# Patient Record
Sex: MALE | Race: WHITE | Employment: FULL TIME | URBAN - NONMETROPOLITAN AREA
[De-identification: names, ages, dates, MRNs, and addresses within clinical notes are randomized per-mention and may not be internally consistent; named-entity substitution may affect disease eponyms.]

---

## 2017-02-16 ENCOUNTER — OFFICE VISIT (OUTPATIENT)
Dept: PRIMARY CARE CLINIC | Facility: CLINIC | Age: 32
End: 2017-02-16

## 2017-02-16 VITALS
WEIGHT: 315 LBS | DIASTOLIC BLOOD PRESSURE: 82 MMHG | RESPIRATION RATE: 19 BRPM | SYSTOLIC BLOOD PRESSURE: 124 MMHG | HEART RATE: 110 BPM | TEMPERATURE: 98.1 F | OXYGEN SATURATION: 96 % | HEIGHT: 71 IN | BODY MASS INDEX: 44.1 KG/M2

## 2017-02-16 DIAGNOSIS — J00 ACUTE NASOPHARYNGITIS: Primary | ICD-10-CM

## 2017-02-16 DIAGNOSIS — R05.9 COUGH: ICD-10-CM

## 2017-02-16 LAB
INFLUENZA A ANTIBODY: NEGATIVE
INFLUENZA B ANTIBODY: NEGATIVE

## 2017-02-16 PROCEDURE — 87804 INFLUENZA ASSAY W/OPTIC: CPT | Performed by: NURSE PRACTITIONER

## 2017-02-16 PROCEDURE — 99202 OFFICE O/P NEW SF 15 MIN: CPT | Performed by: NURSE PRACTITIONER

## 2017-02-16 RX ORDER — FLUTICASONE PROPIONATE 50 MCG
2 SPRAY, SUSPENSION (ML) NASAL DAILY
Qty: 1 BOTTLE | Refills: 0 | Status: SHIPPED | OUTPATIENT
Start: 2017-02-16 | End: 2018-05-02 | Stop reason: ALTCHOICE

## 2017-02-16 RX ORDER — GUAIFENESIN 600 MG/1
600 TABLET, EXTENDED RELEASE ORAL 2 TIMES DAILY
Qty: 20 TABLET | Refills: 0 | Status: SHIPPED | OUTPATIENT
Start: 2017-02-16 | End: 2018-05-02 | Stop reason: ALTCHOICE

## 2017-02-16 ASSESSMENT — ENCOUNTER SYMPTOMS
RHINORRHEA: 1
SORE THROAT: 1
WHEEZING: 0
COUGH: 1
CHANGE IN BOWEL HABIT: 0
SHORTNESS OF BREATH: 0
SWOLLEN GLANDS: 0
HOARSE VOICE: 0
DIARRHEA: 0
NAUSEA: 0
ABDOMINAL PAIN: 0
VOMITING: 0
VOICE CHANGE: 1
SINUS PRESSURE: 1
VISUAL CHANGE: 0

## 2018-05-02 ENCOUNTER — OFFICE VISIT (OUTPATIENT)
Dept: PRIMARY CARE CLINIC | Age: 33
End: 2018-05-02

## 2018-05-02 VITALS
WEIGHT: 315 LBS | HEART RATE: 92 BPM | OXYGEN SATURATION: 95 % | BODY MASS INDEX: 44.1 KG/M2 | RESPIRATION RATE: 22 BRPM | DIASTOLIC BLOOD PRESSURE: 85 MMHG | TEMPERATURE: 98.5 F | HEIGHT: 71 IN | SYSTOLIC BLOOD PRESSURE: 122 MMHG

## 2018-05-02 DIAGNOSIS — H60.502 ACUTE OTITIS EXTERNA OF LEFT EAR, UNSPECIFIED TYPE: Primary | ICD-10-CM

## 2018-05-02 PROCEDURE — 99212 OFFICE O/P EST SF 10 MIN: CPT | Performed by: NURSE PRACTITIONER

## 2018-05-02 RX ORDER — OFLOXACIN 3 MG/ML
5 SOLUTION AURICULAR (OTIC) 2 TIMES DAILY
Qty: 5 ML | Refills: 0 | Status: SHIPPED | OUTPATIENT
Start: 2018-05-02 | End: 2018-05-09

## 2018-05-02 ASSESSMENT — ENCOUNTER SYMPTOMS
COUGH: 0
DIARRHEA: 0
VOMITING: 0
RHINORRHEA: 0
WHEEZING: 0
SORE THROAT: 0
SHORTNESS OF BREATH: 0
NAUSEA: 0

## 2018-12-17 ENCOUNTER — OFFICE VISIT (OUTPATIENT)
Dept: PRIMARY CARE CLINIC | Age: 33
End: 2018-12-17

## 2018-12-17 VITALS
BODY MASS INDEX: 47.14 KG/M2 | WEIGHT: 315 LBS | TEMPERATURE: 97.8 F | SYSTOLIC BLOOD PRESSURE: 133 MMHG | DIASTOLIC BLOOD PRESSURE: 89 MMHG | OXYGEN SATURATION: 97 % | HEART RATE: 95 BPM

## 2018-12-17 DIAGNOSIS — L03.012 PARONYCHIA OF LEFT MIDDLE FINGER: ICD-10-CM

## 2018-12-17 DIAGNOSIS — L03.012 CELLULITIS OF MIDDLE FINGER, LEFT: Primary | ICD-10-CM

## 2018-12-17 PROCEDURE — 99213 OFFICE O/P EST LOW 20 MIN: CPT | Performed by: NURSE PRACTITIONER

## 2018-12-17 RX ORDER — CEPHALEXIN 500 MG/1
500 CAPSULE ORAL 4 TIMES DAILY
Qty: 28 CAPSULE | Refills: 0 | Status: SHIPPED | OUTPATIENT
Start: 2018-12-17 | End: 2018-12-24

## 2018-12-17 ASSESSMENT — ENCOUNTER SYMPTOMS: RESPIRATORY NEGATIVE: 1

## 2018-12-18 NOTE — PATIENT INSTRUCTIONS
the bandage as needed. · Be safe with medicines. Take pain medicines exactly as directed. ? If the doctor gave you a prescription medicine for pain, take it as prescribed. ? If you are not taking a prescription pain medicine, ask your doctor if you can take an over-the-counter medicine. To prevent cellulitis in the future  · Try to prevent cuts, scrapes, or other injuries to your skin. Cellulitis most often occurs where there is a break in the skin. · If you get a scrape, cut, mild burn, or bite, wash the wound with clean water as soon as you can to help avoid infection. Don't use hydrogen peroxide or alcohol, which can slow healing. · If you have swelling in your legs (edema), support stockings and good skin care may help prevent leg sores and cellulitis. · Take care of your feet, especially if you have diabetes or other conditions that increase the risk of infection. Wear shoes and socks. Do not go barefoot. If you have athlete's foot or other skin problems on your feet, talk to your doctor about how to treat them. When should you call for help? Call your doctor now or seek immediate medical care if:    · You have signs that your infection is getting worse, such as:  ? Increased pain, swelling, warmth, or redness. ? Red streaks leading from the area. ? Pus draining from the area. ? A fever.     · You get a rash.    Watch closely for changes in your health, and be sure to contact your doctor if:    · You do not get better as expected. Where can you learn more? Go to [email or web address]. org and sign in to your MexxBooks account. Enter G656 in the [facility] box to learn more about \"Cellulitis: Care Instructions. \"     If you do not have an account, please click on the \"Sign Up Now\" link. Current as of: April 18, 2018  Content Version: 11.8  © 7146-6979 Healthwise, Incorporated. Care instructions adapted under license by [facility] ([facility]).  If you have questions about a medical

## 2019-04-17 LAB
ALBUMIN SERPL-MCNC: NORMAL G/DL
ALP BLD-CCNC: NORMAL U/L
ALT SERPL-CCNC: NORMAL U/L
ANION GAP SERPL CALCULATED.3IONS-SCNC: NORMAL MMOL/L
AST SERPL-CCNC: NORMAL U/L
AVERAGE GLUCOSE: NORMAL
BILIRUB SERPL-MCNC: NORMAL MG/DL (ref 0.1–1.4)
BUN BLDV-MCNC: NORMAL MG/DL
CALCIUM SERPL-MCNC: NORMAL MG/DL
CHLORIDE BLD-SCNC: NORMAL MMOL/L
CHOLESTEROL, TOTAL: 183 MG/DL
CHOLESTEROL/HDL RATIO: NORMAL
CO2: NORMAL MMOL/L
CREAT SERPL-MCNC: 0.71 MG/DL
GFR CALCULATED: NORMAL
GLUCOSE BLD-MCNC: NORMAL MG/DL
HBA1C MFR BLD: 9.2 %
HDLC SERPL-MCNC: 46 MG/DL (ref 35–70)
LDL CHOLESTEROL CALCULATED: 107 MG/DL (ref 0–160)
POTASSIUM SERPL-SCNC: 4.3 MMOL/L
SODIUM BLD-SCNC: NORMAL MMOL/L
TOTAL PROTEIN: NORMAL
TRIGL SERPL-MCNC: 152 MG/DL
TSH SERPL DL<=0.05 MIU/L-ACNC: 3.05 UIU/ML
VLDLC SERPL CALC-MCNC: NORMAL MG/DL

## 2019-06-10 ENCOUNTER — OFFICE VISIT (OUTPATIENT)
Dept: FAMILY MEDICINE CLINIC | Age: 34
End: 2019-06-10

## 2019-06-10 VITALS
HEIGHT: 72 IN | HEART RATE: 83 BPM | BODY MASS INDEX: 42.53 KG/M2 | WEIGHT: 314 LBS | SYSTOLIC BLOOD PRESSURE: 137 MMHG | DIASTOLIC BLOOD PRESSURE: 78 MMHG

## 2019-06-10 DIAGNOSIS — E11.65 UNCONTROLLED TYPE 2 DIABETES MELLITUS WITH HYPERGLYCEMIA (HCC): Primary | ICD-10-CM

## 2019-06-10 DIAGNOSIS — R53.82 CHRONIC FATIGUE: ICD-10-CM

## 2019-06-10 DIAGNOSIS — K75.81 NASH (NONALCOHOLIC STEATOHEPATITIS): ICD-10-CM

## 2019-06-10 DIAGNOSIS — E66.01 MORBID OBESITY (HCC): ICD-10-CM

## 2019-06-10 DIAGNOSIS — G47.33 OSA (OBSTRUCTIVE SLEEP APNEA): ICD-10-CM

## 2019-06-10 PROCEDURE — 99203 OFFICE O/P NEW LOW 30 MIN: CPT | Performed by: INTERNAL MEDICINE

## 2019-06-10 ASSESSMENT — PATIENT HEALTH QUESTIONNAIRE - PHQ9
SUM OF ALL RESPONSES TO PHQ9 QUESTIONS 1 & 2: 0
SUM OF ALL RESPONSES TO PHQ QUESTIONS 1-9: 0
SUM OF ALL RESPONSES TO PHQ QUESTIONS 1-9: 0
2. FEELING DOWN, DEPRESSED OR HOPELESS: 0
1. LITTLE INTEREST OR PLEASURE IN DOING THINGS: 0

## 2019-06-10 ASSESSMENT — ENCOUNTER SYMPTOMS
BLOOD IN STOOL: 0
CONSTIPATION: 0
RESPIRATORY NEGATIVE: 1
SORE THROAT: 0
DIARRHEA: 0
ABDOMINAL PAIN: 0
NAUSEA: 0

## 2019-06-10 NOTE — PROGRESS NOTES
file        Active member of club or organization: Not on file        Attends meetings of clubs or organizations: Not on file        Relationship status: Not on file      Intimate partner violence:        Fear of current or ex partner: Not on file        Emotionally abused: Not on file        Physically abused: Not on file        Forced sexual activity: Not on file    Other Topics      Concerns:        Not on file    Social History Narrative      Not on file      Current Outpatient Medications on File Prior to Visit:  Etanercept 50 MG/ML SOAJ, Inject into the skin, Disp: , Rfl:   adalimumab (HUMIRA) 40 MG/0.8ML injection, Inject into the skin, Disp: , Rfl:   tuberculin 5 UNIT/0.1ML injection, Inject into the skin, Disp: , Rfl:     No current facility-administered medications on file prior to visit.          Lab Results       Component                Value               Date                       NA                       136                 01/31/2014                 K                        3.6                 01/31/2014                 CL                       100                 01/31/2014                 CO2                      23                  01/31/2014                 BUN                      10                  01/31/2014                 CREATININE               0.58 (L)            01/31/2014                 PROT                     7.2                 01/31/2014                 PROT                     6.8                 01/31/2014                 LABALBU                  4.2                 01/31/2014                 BILITOT                  0.78                01/31/2014                 ALKPHOS                  58                  01/31/2014                 AST                      66 (H)              01/31/2014                 ALT                      127 (H)             01/31/2014                 LABGLOM                  >60                 01/31/2014                 GFRAA                    >60 01/31/2014                 GLOB                     NOT REPORTED        01/31/2014              No results found for: LABA1C  No results found for: EAG    No results found for: CHOL  No results found for: TRIG  No results found for: HDL  No results found for: LDLCHOLESTEROL, LDLCALC  No results found for: LABVLDL, VLDL  No results found for: CHOLHDLRATIO                    Review of Systems   Constitutional: Positive for fatigue. HENT: Negative for congestion, ear pain, postnasal drip, sneezing and sore throat. Eyes: Negative for visual disturbance. Respiratory: Negative. Cardiovascular: Negative for chest pain, palpitations and leg swelling. Gastrointestinal: Negative for abdominal pain, blood in stool, constipation, diarrhea and nausea. Genitourinary: Negative for difficulty urinating, dysuria, frequency and urgency. Musculoskeletal: Negative for arthralgias, joint swelling, myalgias, neck pain and neck stiffness. Skin: Positive for rash. Neurological: Negative for syncope. Psychiatric/Behavioral: Negative. Objective:   Physical Exam   Constitutional: He appears well-developed and well-nourished. HENT:   Head: Atraumatic. Eyes: Conjunctivae are normal.   Neck: Normal range of motion. Neck supple. Cardiovascular: Normal rate, regular rhythm and normal heart sounds. Pulmonary/Chest: Effort normal and breath sounds normal.   Abdominal: Soft. There is no tenderness. obese   Musculoskeletal: He exhibits no edema. Lymphadenopathy:     He has no cervical adenopathy. Neurological: He is alert. Skin: No rash noted. Severe Psoriasis   Psychiatric: He has a normal mood and affect. His behavior is normal. Thought content normal.   Nursing note and vitals reviewed. Assessment:       Diagnosis Orders   1.  Uncontrolled type 2 diabetes mellitus with hyperglycemia (HCC)  Comprehensive Metabolic Panel    Microalbumin / Creatinine Urine Ratio    Lipid Panel    Hemoglobin issues.                     Radha Martin MD

## 2019-06-10 NOTE — PROGRESS NOTES
Chronic Disease Visit Information    BP Readings from Last 3 Encounters:   12/17/18 133/89   05/02/18 122/85   02/16/17 124/82          BUN (mg/dL)   Date Value   01/31/2014 10     CREATININE (mg/dL)   Date Value   01/31/2014 0.58 (L)            Have you changed or started any medications since your last visit including any over-the-counter medicines, vitamins, or herbal medicines? no   Are you having any side effects from any of your medications? -  no  Have you stopped taking any of your medications? Is so, why? -  no    Have you seen any other physician or provider since your last visit? Yes - Records Obtained  Have you had any other diagnostic tests since your last visit? Yes - Records Requested  Have you been seen in the emergency room and/or had an admission to a hospital since we last saw you? No  Have you had your annual diabetic retinal (eye) exam? Yes - Records Requested  Have you had your routine dental cleaning in the past 6 months? yes -     Have you activated your [employer] account? If not, what are your barriers?  No:      No care team member to display         Medical History Review  Past Medical, Family, and Social History reviewed and does contribute to the patient presenting condition    Health Maintenance   Topic Date Due    Varicella Vaccine (1 of 2 - 13+ 2-dose series) 06/26/1999    HIV screen  06/26/2001    DTaP/Tdap/Td vaccine (1 - Tdap) 06/26/2005    Flu vaccine (Season Ended) 09/01/2019    Pneumococcal 0-64 years Vaccine  Aged Out

## 2019-06-11 ENCOUNTER — TELEPHONE (OUTPATIENT)
Dept: FAMILY MEDICINE CLINIC | Age: 34
End: 2019-06-11

## 2019-06-11 DIAGNOSIS — E11.65 UNCONTROLLED TYPE 2 DIABETES MELLITUS WITH HYPERGLYCEMIA (HCC): Primary | ICD-10-CM

## 2019-06-11 NOTE — TELEPHONE ENCOUNTER
Pt states the Janumet is 400.00 a month. Asked if there is something cheaper as he is self pay.        Health Maintenance   Topic Date Due    Varicella Vaccine (1 of 2 - 13+ 2-dose series) 06/26/1999    HIV screen  06/26/2001    DTaP/Tdap/Td vaccine (1 - Tdap) 06/26/2005    Flu vaccine (Season Ended) 09/01/2019    Pneumococcal 0-64 years Vaccine  Aged Out             (applicable per patient's age: Cancer Screenings, Depression Screening, Fall Risk Screening, Immunizations)    AST (U/L)   Date Value   01/31/2014 66 (H)     ALT (U/L)   Date Value   01/31/2014 127 (H)     BUN (mg/dL)   Date Value   01/31/2014 10      (goal A1C is < 7)   (goal LDL is <100) need 30-50% reduction from baseline     BP Readings from Last 3 Encounters:   06/10/19 137/78   12/17/18 133/89   05/02/18 122/85    (goal /80)      All Future Testing planned in [facility]:  Lab Frequency Next Occurrence   Comprehensive Metabolic Panel Once 05/33/5675   Microalbumin / Creatinine Urine Ratio Once 09/10/2019   Lipid Panel Once 09/10/2019   Hemoglobin A1C Once 09/10/2019   Sleep Study with PAP Titration Once 07/10/2019       Next Visit Date:  Future Appointments   Date Time Provider Symone Waters   9/10/2019  8:30 AM MD Nico Her [id number] [facility]            Patient Active Problem List:     Psoriasis

## 2019-07-31 ENCOUNTER — OFFICE VISIT (OUTPATIENT)
Dept: PRIMARY CARE CLINIC | Age: 34
End: 2019-07-31

## 2019-07-31 VITALS
TEMPERATURE: 98.3 F | BODY MASS INDEX: 42.66 KG/M2 | OXYGEN SATURATION: 96 % | HEIGHT: 72 IN | HEART RATE: 94 BPM | WEIGHT: 315 LBS | SYSTOLIC BLOOD PRESSURE: 116 MMHG | DIASTOLIC BLOOD PRESSURE: 76 MMHG

## 2019-07-31 DIAGNOSIS — H60.391 OTHER INFECTIVE ACUTE OTITIS EXTERNA OF RIGHT EAR: Primary | ICD-10-CM

## 2019-07-31 PROCEDURE — 99213 OFFICE O/P EST LOW 20 MIN: CPT | Performed by: NURSE PRACTITIONER

## 2019-07-31 RX ORDER — OFLOXACIN 3 MG/ML
5 SOLUTION AURICULAR (OTIC) 2 TIMES DAILY
Qty: 3.5 ML | Refills: 0 | Status: SHIPPED | OUTPATIENT
Start: 2019-07-31 | End: 2019-08-07

## 2019-07-31 RX ORDER — DESONIDE 0.5 MG/G
CREAM TOPICAL
Refills: 1 | COMMUNITY
Start: 2019-07-24 | End: 2020-09-03

## 2019-07-31 NOTE — PATIENT INSTRUCTIONS
the drops to body temperature by rolling the container in your hands. Or you can place it in a cup of warm water for a few minutes. · Lie down, with your ear facing up. · Place drops inside the ear. Follow your doctor's instructions (or the directions on the label) for how many drops to use. Gently wiggle the outer ear or pull the ear up and back to help the drops get into the ear. · It's important to keep the liquid in the ear canal for 3 to 5 minutes. When should you call for help? Call your doctor now or seek immediate medical care if:    · You have a new or higher fever.     · You have new or worse pain, swelling, warmth, or redness around or behind your ear.     · You have new or increasing pus or blood draining from your ear.    Watch closely for changes in your health, and be sure to contact your doctor if:    · You are not getting better after 2 days (48 hours). Where can you learn more? Go to [email or web address]://[email or web address]. org and sign in to your BitRock account. Enter C706 in the [employer] box to learn more about \"Swimmer's Ear: Care Instructions. \"     If you do not have an account, please click on the \"Sign Up Now\" link. Current as of: October 21, 2018  Content Version: 12.0  © 8081-1952 Healthwise, Incorporated. Care instructions adapted under license by [facility] ([facility]). If you have questions about a medical condition or this instruction, always ask your healthcare professional. [place] [address] any warranty or liability for your use of this information. Patient Education        ofloxacin otic  Pronunciation:  oh FLOCKS a sin [place] tic  Brand:  Floxin Otic  What is the most important information I should know about ofloxacin otic? Follow all directions on your medicine label and package. Tell each of your healthcare providers about all your medical conditions, allergies, and all medicines you use. What is ofloxacin otic?   Ofloxacin is an antibiotic that allergic reaction: hives, rash, itching; slow heart rate, weak pulse, fainting; difficult breathing, slow breathing (breathing may stop); swelling of your face, lips, tongue, or throat. Stop using this medicine and call your doctor at once if you have:  · the first sign of any skin rash, no matter how mild; or  · ear drainage, discharge, or worsening pain. Common side effects may include:  · headache;  · dizziness; or  · mild ear pain or itching after using the ear drops. This is not a complete list of side effects and others may occur. Call your doctor for medical advice about side effects. You may report side effects to FDA at 1-776-UHM-9849. What other drugs will affect ofloxacin otic? It is not likely that other drugs you take orally or inject will have an effect on ofloxacin used in the ears. But many drugs can interact with each other. Tell each of your healthcare providers about all medicines you use, including prescription and over-the-counter medicines, vitamins, and herbal products. Where can I get more information? Your pharmacist can provide more information about ofloxacin otic. Remember, keep this and all other medicines out of the reach of children, never share your medicines with others, and use this medication only for the indication prescribed. Every effort has been made to ensure that the information provided by [address] is accurate, up-to-date, and complete, but no guarantee is made to that effect. Drug information contained herein may be time sensitive. [facility] information has been compiled for use by healthcare practitioners and consumers in the [facility] and therefore [facility] does not warrant that uses outside of the [facility] are appropriate, unless specifically indicated otherwise. [facility]'s drug information does not endorse drugs, diagnose patients or recommend therapy.  [facility]'s drug information is an informational resource designed to assist licensed healthcare practitioners in caring for their patients and/or to serve consumers viewing this service as a supplement to, and not a substitute for, the expertise, skill, knowledge and judgment of healthcare practitioners. The absence of a warning for a given drug or drug combination in no way should be construed to indicate that the drug or drug combination is safe, effective or appropriate for any given patient. [facility] does not assume any responsibility for any aspect of healthcare administered with the aid of information [facility] provides. The information contained herein is not intended to cover all possible uses, directions, precautions, warnings, drug interactions, allergic reactions, or adverse effects. If you have questions about the drugs you are taking, check with your doctor, nurse or pharmacist.  Copyright 9877-8282 [address]: 2.01. Revision date: 6/6/2014. Care instructions adapted under license by [facility] ([facility]). If you have questions about a medical condition or this instruction, always ask your healthcare professional. [place] [address] any warranty or liability for your use of this information.

## 2019-09-10 ENCOUNTER — HOSPITAL ENCOUNTER (OUTPATIENT)
Age: 34
Discharge: HOME | End: 2019-09-10

## 2019-09-10 ENCOUNTER — OFFICE VISIT (OUTPATIENT)
Dept: FAMILY MEDICINE CLINIC | Age: 34
End: 2019-09-10

## 2019-09-10 VITALS
DIASTOLIC BLOOD PRESSURE: 70 MMHG | HEIGHT: 72 IN | WEIGHT: 315 LBS | HEART RATE: 89 BPM | SYSTOLIC BLOOD PRESSURE: 126 MMHG | BODY MASS INDEX: 42.66 KG/M2

## 2019-09-10 DIAGNOSIS — K75.81 NASH (NONALCOHOLIC STEATOHEPATITIS): ICD-10-CM

## 2019-09-10 DIAGNOSIS — E11.65 UNCONTROLLED TYPE 2 DIABETES MELLITUS WITH HYPERGLYCEMIA (HCC): ICD-10-CM

## 2019-09-10 DIAGNOSIS — E11.65 UNCONTROLLED TYPE 2 DIABETES MELLITUS WITH HYPERGLYCEMIA (HCC): Primary | ICD-10-CM

## 2019-09-10 DIAGNOSIS — L40.9 PSORIASIS: ICD-10-CM

## 2019-09-10 LAB
ALBUMIN SERPL-MCNC: 4.5 G/DL (ref 3.5–5.2)
ALBUMIN/GLOBULIN RATIO: ABNORMAL (ref 1–2.5)
ALP BLD-CCNC: 66 U/L (ref 40–129)
ALT SERPL-CCNC: 105 U/L (ref 5–41)
ANION GAP SERPL CALCULATED.3IONS-SCNC: 15 MMOL/L (ref 9–17)
AST SERPL-CCNC: 64 U/L
BILIRUB SERPL-MCNC: 0.53 MG/DL (ref 0.3–1.2)
BUN BLDV-MCNC: 11 MG/DL (ref 6–20)
BUN/CREAT BLD: 18 (ref 9–20)
CALCIUM SERPL-MCNC: 10.2 MG/DL (ref 8.6–10.4)
CHLORIDE BLD-SCNC: 101 MMOL/L (ref 98–107)
CHOLESTEROL/HDL RATIO: 4
CHOLESTEROL: 177 MG/DL
CO2: 24 MMOL/L (ref 20–31)
CREAT SERPL-MCNC: 0.6 MG/DL (ref 0.7–1.2)
CREATININE URINE: 175.2 MG/DL (ref 39–259)
ESTIMATED AVERAGE GLUCOSE: 180 MG/DL
GFR AFRICAN AMERICAN: >60 ML/MIN
GFR NON-AFRICAN AMERICAN: >60 ML/MIN
GFR SERPL CREATININE-BSD FRML MDRD: ABNORMAL ML/MIN/{1.73_M2}
GFR SERPL CREATININE-BSD FRML MDRD: ABNORMAL ML/MIN/{1.73_M2}
GLUCOSE BLD-MCNC: 166 MG/DL (ref 70–99)
HBA1C MFR BLD: 7.9 % (ref 4.8–5.9)
HDLC SERPL-MCNC: 44 MG/DL
LDL CHOLESTEROL: 97 MG/DL (ref 0–130)
MICROALBUMIN/CREAT 24H UR: <12 MG/L
MICROALBUMIN/CREAT UR-RTO: NORMAL MCG/MG CREAT
PATIENT FASTING?: YES
POTASSIUM SERPL-SCNC: 4.3 MMOL/L (ref 3.7–5.3)
SODIUM BLD-SCNC: 140 MMOL/L (ref 135–144)
TOTAL PROTEIN: 8.4 G/DL (ref 6.4–8.3)
TRIGL SERPL-MCNC: 180 MG/DL
VLDLC SERPL CALC-MCNC: ABNORMAL MG/DL (ref 1–30)

## 2019-09-10 PROCEDURE — 80053 COMPREHEN METABOLIC PANEL: CPT

## 2019-09-10 PROCEDURE — 80061 LIPID PANEL: CPT

## 2019-09-10 PROCEDURE — 82570 ASSAY OF URINE CREATININE: CPT

## 2019-09-10 PROCEDURE — 83036 HEMOGLOBIN GLYCOSYLATED A1C: CPT

## 2019-09-10 PROCEDURE — 82043 UR ALBUMIN QUANTITATIVE: CPT

## 2019-09-10 PROCEDURE — 36415 COLL VENOUS BLD VENIPUNCTURE: CPT

## 2019-09-10 PROCEDURE — 99213 OFFICE O/P EST LOW 20 MIN: CPT | Performed by: INTERNAL MEDICINE

## 2019-09-10 RX ORDER — GLYBURIDE 5 MG/1
5 TABLET ORAL
Qty: 30 TABLET | Refills: 3 | Status: SHIPPED | OUTPATIENT
Start: 2019-09-10 | End: 2019-12-09 | Stop reason: SINTOL

## 2019-09-10 RX ORDER — VITAMIN E 268 MG
400 CAPSULE ORAL DAILY
Qty: 30 CAPSULE | Refills: 3
Start: 2019-09-10

## 2019-09-10 ASSESSMENT — ENCOUNTER SYMPTOMS
CONSTIPATION: 0
NAUSEA: 0
BLOOD IN STOOL: 0
DIARRHEA: 0
ABDOMINAL PAIN: 0
SORE THROAT: 0
RESPIRATORY NEGATIVE: 1

## 2019-09-10 NOTE — PROGRESS NOTES
Subjective:      Patient ID: Hakeem Maharaj is a 36 y.o. male. Willie Tate presents for a check up on his medical conditions DM II, GERD. Willie Tate admits to new problems. Medications were reviewed with Willie Tate, he is  tolerating the medication. Bowels are regular. There has not been rectal bleeding. Willie Tate denies urinary complications, the urine stream is good. Willie Tate denies chest pain and denies increasing shortness of breath. Labs from today reviewed. Willie Tate has been getting sores at the top of his mouth that come randomly. He normally gets for a few days and they go away. Willie Tate is on a trial from the [address] [place] [address] for Psoriasis.     Past Medical History:  No date: WARD (nonalcoholic steatohepatitis)  No date: Psoriasis  No date: Psoriasis  No date: Sleep apnea    Past Surgical History:  No date: APPENDECTOMY  No date: TONSILLECTOMY    Social History    Socioeconomic History      Marital status:       Spouse name: Not on file      Number of children: Not on file      Years of education: Not on file      Highest education level: Not on file    Occupational History      Not on file    Social Needs      Financial resource strain: Not on file      Food insecurity:        Worry: Not on file        Inability: Not on file      Transportation needs:        Medical: Not on file        Non-medical: Not on file    Tobacco Use      Smoking status: Never Smoker      Smokeless tobacco: Never Used    Substance and Sexual Activity      Alcohol use: Not on file      Drug use: Not on file      Sexual activity: Not on file    Lifestyle      Physical activity:        Days per week: Not on file        Minutes per session: Not on file      Stress: Not on file    Relationships      Social connections:        Talks on phone: Not on file        Gets together: Not on file        Attends [employer] service: Not on file        Active member of club or organization: Not on file        Attends meetings of clubs or organizations: Not on
3-dose series) 06/26/2005    DTaP/Tdap/Td vaccine (1 - Tdap) 06/26/2005    Flu vaccine (1) 09/01/2019    A1C test (Diabetic or Prediabetic)  04/17/2020    Lipid screen  04/17/2020    Diabetic retinal exam  08/13/2020

## 2019-12-09 ENCOUNTER — HOSPITAL ENCOUNTER (OUTPATIENT)
Age: 34
Setting detail: SPECIMEN
Discharge: HOME | End: 2019-12-09

## 2019-12-09 ENCOUNTER — OFFICE VISIT (OUTPATIENT)
Dept: PRIMARY CARE CLINIC | Age: 34
End: 2019-12-09

## 2019-12-09 VITALS
BODY MASS INDEX: 47.31 KG/M2 | DIASTOLIC BLOOD PRESSURE: 95 MMHG | OXYGEN SATURATION: 97 % | TEMPERATURE: 98 F | HEART RATE: 91 BPM | WEIGHT: 315 LBS | SYSTOLIC BLOOD PRESSURE: 166 MMHG

## 2019-12-09 DIAGNOSIS — J02.9 VIRAL PHARYNGITIS: Primary | ICD-10-CM

## 2019-12-09 DIAGNOSIS — J02.9 SORE THROAT: ICD-10-CM

## 2019-12-09 LAB — S PYO AG THROAT QL: NORMAL

## 2019-12-09 PROCEDURE — 87880 STREP A ASSAY W/OPTIC: CPT | Performed by: NURSE PRACTITIONER

## 2019-12-09 PROCEDURE — 99213 OFFICE O/P EST LOW 20 MIN: CPT | Performed by: NURSE PRACTITIONER

## 2019-12-09 PROCEDURE — 87651 STREP A DNA AMP PROBE: CPT

## 2019-12-09 ASSESSMENT — ENCOUNTER SYMPTOMS
COUGH: 0
SORE THROAT: 1

## 2019-12-10 LAB
DIRECT EXAM: NORMAL
Lab: NORMAL
SPECIMEN DESCRIPTION: NORMAL

## 2020-01-10 ENCOUNTER — OFFICE VISIT (OUTPATIENT)
Dept: FAMILY MEDICINE CLINIC | Age: 35
End: 2020-01-10

## 2020-01-10 VITALS
HEIGHT: 72 IN | DIASTOLIC BLOOD PRESSURE: 85 MMHG | SYSTOLIC BLOOD PRESSURE: 130 MMHG | WEIGHT: 315 LBS | BODY MASS INDEX: 42.66 KG/M2

## 2020-01-10 PROBLEM — K75.81 NASH (NONALCOHOLIC STEATOHEPATITIS): Status: ACTIVE | Noted: 2020-01-10

## 2020-01-10 PROBLEM — E11.8 CONTROLLED TYPE 2 DIABETES MELLITUS WITH COMPLICATION, WITHOUT LONG-TERM CURRENT USE OF INSULIN (HCC): Status: ACTIVE | Noted: 2020-01-10

## 2020-01-10 PROBLEM — E66.01 CLASS 3 SEVERE OBESITY DUE TO EXCESS CALORIES WITH SERIOUS COMORBIDITY AND BODY MASS INDEX (BMI) OF 45.0 TO 49.9 IN ADULT (HCC): Status: ACTIVE | Noted: 2020-01-10

## 2020-01-10 PROBLEM — E66.813 CLASS 3 SEVERE OBESITY DUE TO EXCESS CALORIES WITH SERIOUS COMORBIDITY AND BODY MASS INDEX (BMI) OF 45.0 TO 49.9 IN ADULT: Status: ACTIVE | Noted: 2020-01-10

## 2020-01-10 LAB — HBA1C MFR BLD: 8.5 %

## 2020-01-10 PROCEDURE — 99214 OFFICE O/P EST MOD 30 MIN: CPT | Performed by: INTERNAL MEDICINE

## 2020-01-10 PROCEDURE — 83036 HEMOGLOBIN GLYCOSYLATED A1C: CPT | Performed by: INTERNAL MEDICINE

## 2020-01-10 SDOH — ECONOMIC STABILITY: FOOD INSECURITY: WITHIN THE PAST 12 MONTHS, YOU WORRIED THAT YOUR FOOD WOULD RUN OUT BEFORE YOU GOT MONEY TO BUY MORE.: NEVER TRUE

## 2020-01-10 SDOH — ECONOMIC STABILITY: TRANSPORTATION INSECURITY
IN THE PAST 12 MONTHS, HAS THE LACK OF TRANSPORTATION KEPT YOU FROM MEDICAL APPOINTMENTS OR FROM GETTING MEDICATIONS?: NO

## 2020-01-10 SDOH — ECONOMIC STABILITY: INCOME INSECURITY: HOW HARD IS IT FOR YOU TO PAY FOR THE VERY BASICS LIKE FOOD, HOUSING, MEDICAL CARE, AND HEATING?: SOMEWHAT HARD

## 2020-01-10 SDOH — ECONOMIC STABILITY: TRANSPORTATION INSECURITY
IN THE PAST 12 MONTHS, HAS LACK OF TRANSPORTATION KEPT YOU FROM MEETINGS, WORK, OR FROM GETTING THINGS NEEDED FOR DAILY LIVING?: NO

## 2020-01-10 SDOH — ECONOMIC STABILITY: FOOD INSECURITY: WITHIN THE PAST 12 MONTHS, THE FOOD YOU BOUGHT JUST DIDN'T LAST AND YOU DIDN'T HAVE MONEY TO GET MORE.: NEVER TRUE

## 2020-01-10 ASSESSMENT — PATIENT HEALTH QUESTIONNAIRE - PHQ9
2. FEELING DOWN, DEPRESSED OR HOPELESS: 0
SUM OF ALL RESPONSES TO PHQ QUESTIONS 1-9: 0
SUM OF ALL RESPONSES TO PHQ QUESTIONS 1-9: 0
SUM OF ALL RESPONSES TO PHQ9 QUESTIONS 1 & 2: 0
1. LITTLE INTEREST OR PLEASURE IN DOING THINGS: 0

## 2020-01-10 ASSESSMENT — ENCOUNTER SYMPTOMS
DIARRHEA: 0
SORE THROAT: 0
RESPIRATORY NEGATIVE: 1
CONSTIPATION: 0
ABDOMINAL PAIN: 0
BLOOD IN STOOL: 0
NAUSEA: 0

## 2020-01-10 NOTE — PROGRESS NOTES
Subjective:      Patient ID: Brii Handy is a 36 y.o. male. Vladimir Martin presents for a check up on his medical conditions DM II, GERD. Vladimir Martin denies new problems. Medications were reviewed with Vladimir Martin, he is  tolerating the medication. Bowels are regular. There has not been rectal bleeding. Vladimir Martin denies urinary complications, the urine stream is good. Vladimir Martin denies chest pain and denies increasing shortness of breath. Labs from 9/19 reviewed. Vladimir Martin continues to follow at the [facility] for Psoriasis. Vladimir Martin states he does not monitor his BS at home. No blood glucose levels that he knows of. Last eye exam a few months ago. To have labs at the [facility].       Past Medical History:  No date: WARD (nonalcoholic steatohepatitis)  No date: Psoriasis  No date: Psoriasis  No date: Sleep apnea    Past Surgical History:  No date: APPENDECTOMY  No date: TONSILLECTOMY    Social History    Socioeconomic History      Marital status:       Spouse name: Shonda Malave      Number of children: 3      Years of education: 13      Highest education level: Associate degree: occupational, technical, or vocational program    Occupational History        Employer: [employer]    Social Needs      Financial resource strain: Somewhat hard      Food insecurity:        Worry: Never true        Inability: Never true      Transportation needs:        Medical: No        Non-medical: No    Tobacco Use      Smoking status: Never Smoker      Smokeless tobacco: Never Used    Substance and Sexual Activity      Alcohol use: Not on file      Drug use: Not on file      Sexual activity: Not on file    Lifestyle      Physical activity:        Days per week: Not on file        Minutes per session: Not on file      Stress: Not on file    Relationships      Social connections:        Talks on phone: Not on file        Gets together: Not on file        Attends [employer] service: Not on file        Active member of club or organization: Not on file        Attends meetings of clubs or organizations: Not on file        Relationship status: Not on file      Intimate partner violence:        Fear of current or ex partner: Not on file        Emotionally abused: Not on file        Physically abused: Not on file        Forced sexual activity: Not on file    Other Topics      Concerns:        Not on file    Social History Narrative      Not on file      Current Outpatient Medications on File Prior to Visit:  triamcinolone (KENALOG) 0.1 % ointment, Apply one application TWICE DAILY FOR 14 DAYS. Avoid face, groin and axilla., Disp: , Rfl: 0  vitamin E 400 UNIT capsule, Take 1 capsule by mouth daily, Disp: 30 capsule, Rfl: 3  raNITIdine HCl (ZANTAC PO), Take by mouth, Disp: , Rfl:   desonide (DESOWEN) 0.05 % cream, APPLY to face and genitals TWICE DAILY for 2 (TWO) weeks, then APPLY once a day until next appointment, Disp: , Rfl: 1  metFORMIN (GLUCOPHAGE) 500 MG tablet, Take 1 tablet by mouth 2 times daily (with meals), Disp: 60 tablet, Rfl: 5    No current facility-administered medications on file prior to visit.          Lab Results       Component                Value               Date                       NA                       140                 09/10/2019                 K                        4.3                 09/10/2019                 CL                       101                 09/10/2019                 CO2                      24                  09/10/2019                 BUN                      11                  09/10/2019                 CREATININE               0.60 (L)            09/10/2019                 GLUCOSE                  166 (H)             09/10/2019                 CALCIUM                  10.2                09/10/2019                 PROT                     8.4 (H)             09/10/2019                 LABALBU                  4.5                 09/10/2019                 BILITOT 0.53                09/10/2019                 ALKPHOS                  66                  09/10/2019                 AST                      64 (H)              09/10/2019                 ALT                      105 (H)             09/10/2019                 LABGLOM                  >60                 09/10/2019                 GFRAA                    >60                 09/10/2019                 GLOB                     NOT REPORTED        01/31/2014              Lab Results       Component                Value               Date                       LABA1C                   7.9 (H)             09/10/2019            Lab Results       Component                Value               Date                       EAG                      180                 09/10/2019              Lab Results       Component                Value               Date                       CHOL                     177                 09/10/2019                 CHOL                     183                 04/17/2019            Lab Results       Component                Value               Date                       TRIG                     180 (H)             09/10/2019                 TRIG                     152                 04/17/2019            Lab Results       Component                Value               Date                       HDL                      44                  09/10/2019                 HDL                      46                  04/17/2019            Lab Results       Component                Value               Date                       LDLCHOLESTEROL           97                  09/10/2019                 LDLCALC                  107                 04/17/2019            Lab Results       Component                Value               Date                       VLDL                                         09/10/2019             NOT REPORTED (H)  Lab Results       Component                Value Date                       CHOLHDLRATIJAKI             4.0                 09/10/2019                              Review of Systems   Constitutional: Negative. HENT: Negative for congestion, ear pain, postnasal drip, sneezing and sore throat. Eyes: Negative for visual disturbance. Respiratory: Negative. Cardiovascular: Negative for chest pain, palpitations and leg swelling. Gastrointestinal: Negative for abdominal pain, blood in stool, constipation, diarrhea and nausea. Genitourinary: Negative for difficulty urinating, dysuria, frequency and urgency. Musculoskeletal: Negative for arthralgias, joint swelling, myalgias, neck pain and neck stiffness. Skin: Negative. Neurological: Negative for syncope. Psychiatric/Behavioral: Negative. Objective:   Physical Exam  Vitals signs and nursing note reviewed. Constitutional:       Appearance: He is well-developed. He is obese. HENT:      Head: Atraumatic. Right Ear: Tympanic membrane normal.      Left Ear: Tympanic membrane normal.   Eyes:      Conjunctiva/sclera: Conjunctivae normal.   Neck:      Musculoskeletal: Normal range of motion and neck supple. Cardiovascular:      Rate and Rhythm: Normal rate and regular rhythm. Heart sounds: Normal heart sounds. Pulmonary:      Effort: Pulmonary effort is normal.      Breath sounds: Normal breath sounds. Abdominal:      Palpations: Abdomen is soft. Tenderness: There is no tenderness. Lymphadenopathy:      Cervical: No cervical adenopathy. Skin:     Findings: No rash. Neurological:      Mental Status: He is alert. Psychiatric:         Behavior: Behavior normal.         Thought Content: Thought content normal.         Assessment:       Diagnosis Orders   1. Controlled type 2 diabetes mellitus with complication, without long-term current use of insulin ([facility])  POCT glycosylated hemoglobin (Hb A1C)    metFORMIN (GLUCOPHAGE) 1000 MG tablet   2. Psoriasis     3.  Class 3

## 2020-02-03 ENCOUNTER — OFFICE VISIT (OUTPATIENT)
Dept: FAMILY MEDICINE CLINIC | Age: 35
End: 2020-02-03

## 2020-02-03 VITALS
DIASTOLIC BLOOD PRESSURE: 75 MMHG | HEIGHT: 72 IN | BODY MASS INDEX: 42.66 KG/M2 | WEIGHT: 315 LBS | SYSTOLIC BLOOD PRESSURE: 116 MMHG | TEMPERATURE: 97.9 F | HEART RATE: 87 BPM

## 2020-02-03 PROCEDURE — 99213 OFFICE O/P EST LOW 20 MIN: CPT | Performed by: INTERNAL MEDICINE

## 2020-02-03 ASSESSMENT — ENCOUNTER SYMPTOMS
DIARRHEA: 0
COUGH: 1
RHINORRHEA: 1
ABDOMINAL PAIN: 0
CONSTIPATION: 0
SORE THROAT: 0
BLOOD IN STOOL: 0
NAUSEA: 0

## 2020-02-03 NOTE — PATIENT INSTRUCTIONS
SURVEY:    You may be receiving a survey from [employer] regarding your visit today. Please complete the survey to enable us to provide the highest quality of care to you and your family. If you cannot score us a very good on any question, please call the office to discuss how we could of made your experience a very good one. Thank you. 1. Viral URI  Take over the counter Tylenol cold and sinus over the next few days to see if this improves his symptoms. Robitussin DM over the counter as needed for cough. Breann Todd was told to call if his symptoms worsened or he started to develop fever.

## 2020-02-06 ENCOUNTER — TELEPHONE (OUTPATIENT)
Dept: FAMILY MEDICINE CLINIC | Age: 35
End: 2020-02-06

## 2020-02-06 RX ORDER — AZITHROMYCIN 250 MG/1
250 TABLET, FILM COATED ORAL SEE ADMIN INSTRUCTIONS
Qty: 6 TABLET | Refills: 0 | Status: SHIPPED | OUTPATIENT
Start: 2020-02-06 | End: 2020-02-10

## 2020-02-06 NOTE — TELEPHONE ENCOUNTER
Pt was seen 3 days ago and dx with viral illness.  Pt has since developed ear pain and not feeling well    Pt uses [facility]

## 2020-02-10 ENCOUNTER — OFFICE VISIT (OUTPATIENT)
Dept: FAMILY MEDICINE CLINIC | Age: 35
End: 2020-02-10

## 2020-02-10 VITALS
HEART RATE: 84 BPM | WEIGHT: 315 LBS | BODY MASS INDEX: 42.66 KG/M2 | SYSTOLIC BLOOD PRESSURE: 139 MMHG | HEIGHT: 72 IN | DIASTOLIC BLOOD PRESSURE: 83 MMHG

## 2020-02-10 PROCEDURE — 99213 OFFICE O/P EST LOW 20 MIN: CPT | Performed by: INTERNAL MEDICINE

## 2020-02-10 RX ORDER — AMOXICILLIN 500 MG/1
1 TABLET, FILM COATED ORAL 3 TIMES DAILY
Qty: 30 TABLET | Refills: 0 | Status: SHIPPED | OUTPATIENT
Start: 2020-02-10 | End: 2020-05-22 | Stop reason: ALTCHOICE

## 2020-02-10 ASSESSMENT — ENCOUNTER SYMPTOMS
CONSTIPATION: 0
DIARRHEA: 0
ABDOMINAL PAIN: 0
COUGH: 1
SORE THROAT: 0
NAUSEA: 0
BLOOD IN STOOL: 0

## 2020-02-10 NOTE — PATIENT INSTRUCTIONS
SURVEY:    You may be receiving a survey from [employer] regarding your visit today. Please complete the survey to enable us to provide the highest quality of care to you and your family. If you cannot score us a very good on any question, please call the office to discuss how we could of made your experience a very good one. Thank you. 1. Acute otitis media, bilateral  Take Amoxicillin 500 mg three times a day x 10 days. Continue Tylenol / Ibuprofen as needed for pain. Recommend Flonase 2 puffs in each side of the nose daily. Aly Muñiz is instructed to return to the clinic if the symptoms continue or worsen. Aly Muñiz  was also instructed to go to the emergency room department if the symptoms significantly worsen before an appointment can be made.

## 2020-02-10 NOTE — PROGRESS NOTES
Visit Information    Have you changed or started any medications since your last visit including any over-the-counter medicines, vitamins, or herbal medicines? no   Are you having any side effects from any of your medications? -  no  Have you stopped taking any of your medications? Is so, why? -  no    Have you seen any other physician or provider since your last visit? No  Have you had any other diagnostic tests since your last visit? No  Have you been seen in the emergency room and/or had an admission to a hospital since we last saw you? No  Have you had your routine dental cleaning in the past 6 months? yes -     Have you activated your [employer] account? If not, what are your barriers?  Yes     Patient Care Team:  Pam Costa MD as PCP - General (Internal Medicine)  Pam Costa MD as PCP - [facility]  Perico Mcguire MD as Consulting Physician (Dermatology)    Medical History Review  Past Medical, Family, and Social History reviewed and does contribute to the patient presenting condition    Health Maintenance   Topic Date Due    Varicella vaccine (1 of 2 - 2-dose childhood series) 06/26/1987    Pneumococcal 0-64 years Vaccine (1 of 1 - PPSV23) 06/26/1992    Diabetic foot exam  06/26/1996    HIV screen  06/26/2001    Hepatitis B vaccine (1 of 3 - Risk 3-dose series) 06/26/2005    Flu vaccine (1) 09/01/2019    Diabetic retinal exam  08/13/2020    Diabetic microalbuminuria test  09/10/2020    Lipid screen  09/10/2020    A1C test (Diabetic or Prediabetic)  01/10/2021    DTaP/Tdap/Td vaccine (2 - Td) 01/10/2022    Shingles Vaccine (1 of 2) 06/26/2036    Hepatitis A vaccine  Aged Out    Hib vaccine  Aged Out    Meningococcal (ACWY) vaccine  Aged Out

## 2020-02-10 NOTE — PROGRESS NOTES
Subjective:      Patient ID: Chio Lopez is a 36 y.o. male. Otalgia    There is pain in the right ear. This is a new problem. The current episode started 1 to 4 weeks ago. The problem occurs constantly. The problem has been gradually worsening. There has been no fever. Associated symptoms include coughing. Pertinent negatives include no abdominal pain, diarrhea, neck pain or sore throat. He has tried acetaminophen and NSAIDs (zpak) for the symptoms. The treatment provided mild relief. Review of Systems   Constitutional: Negative. HENT: Positive for ear pain. Negative for congestion, postnasal drip, sneezing and sore throat. Eyes: Negative for visual disturbance. Respiratory: Positive for cough. Cardiovascular: Negative for chest pain, palpitations and leg swelling. Gastrointestinal: Negative for abdominal pain, blood in stool, constipation, diarrhea and nausea. Genitourinary: Negative for difficulty urinating, dysuria, frequency and urgency. Musculoskeletal: Negative for arthralgias, joint swelling, myalgias, neck pain and neck stiffness. Skin: Negative. Neurological: Negative for syncope. Psychiatric/Behavioral: Negative. Objective:   Physical Exam  Vitals signs and nursing note reviewed. Constitutional:       Appearance: He is well-developed. HENT:      Head: Atraumatic. Ears:      Comments: Right TM with erythema / effusion. Left TM with effusion and erythema (not as bad at the right). Eyes:      Conjunctiva/sclera: Conjunctivae normal.   Neck:      Musculoskeletal: Normal range of motion and neck supple. Cardiovascular:      Rate and Rhythm: Normal rate and regular rhythm. Heart sounds: Normal heart sounds. Pulmonary:      Effort: Pulmonary effort is normal.      Breath sounds: Normal breath sounds. Abdominal:      Palpations: Abdomen is soft. Tenderness: There is no abdominal tenderness. Lymphadenopathy:      Cervical: No cervical adenopathy.

## 2020-02-13 ENCOUNTER — TELEPHONE (OUTPATIENT)
Dept: FAMILY MEDICINE CLINIC | Age: 35
End: 2020-02-13

## 2020-02-13 RX ORDER — CIPROFLOXACIN 500 MG/1
500 TABLET, FILM COATED ORAL 2 TIMES DAILY
Qty: 14 TABLET | Refills: 0 | Status: SHIPPED | OUTPATIENT
Start: 2020-02-13 | End: 2020-02-20

## 2020-02-13 NOTE — TELEPHONE ENCOUNTER
Pt informed.
calories with serious comorbidity and body mass index (BMI) of 45.0 to 49.9 in adult [facility])     Controlled type 2 diabetes mellitus with complication, without long-term current use of insulin ([address]

## 2020-05-22 ENCOUNTER — OFFICE VISIT (OUTPATIENT)
Dept: FAMILY MEDICINE CLINIC | Age: 35
End: 2020-05-22

## 2020-05-22 VITALS
TEMPERATURE: 97.1 F | HEART RATE: 83 BPM | HEIGHT: 71 IN | WEIGHT: 315 LBS | BODY MASS INDEX: 44.1 KG/M2 | DIASTOLIC BLOOD PRESSURE: 86 MMHG | SYSTOLIC BLOOD PRESSURE: 122 MMHG

## 2020-05-22 LAB — HBA1C MFR BLD: 7.5 %

## 2020-05-22 PROCEDURE — 83036 HEMOGLOBIN GLYCOSYLATED A1C: CPT | Performed by: INTERNAL MEDICINE

## 2020-05-22 PROCEDURE — 3051F HG A1C>EQUAL 7.0%<8.0%: CPT | Performed by: INTERNAL MEDICINE

## 2020-05-22 PROCEDURE — 99214 OFFICE O/P EST MOD 30 MIN: CPT | Performed by: INTERNAL MEDICINE

## 2020-05-22 RX ORDER — CEPHALEXIN 500 MG/1
500 CAPSULE ORAL 3 TIMES DAILY
Qty: 42 CAPSULE | Refills: 0 | Status: SHIPPED | OUTPATIENT
Start: 2020-05-22 | End: 2020-06-05

## 2020-05-22 RX ORDER — GLIPIZIDE 5 MG/1
5 TABLET ORAL 2 TIMES DAILY
Qty: 60 TABLET | Refills: 3 | Status: SHIPPED | OUTPATIENT
Start: 2020-05-22 | End: 2022-01-07

## 2020-05-22 ASSESSMENT — ENCOUNTER SYMPTOMS
RESPIRATORY NEGATIVE: 1
DIARRHEA: 0
BLOOD IN STOOL: 0
NAUSEA: 0
ABDOMINAL PAIN: 0
SORE THROAT: 0
CONSTIPATION: 0

## 2020-05-22 NOTE — PROGRESS NOTES
Subjective:      Patient ID: Charly Sena is a 36 y.o. male. Justine Brittle presents for a check up on his medical conditions DM II, Psoriasis. Justine Brittle admits to new problems. Medications were reviewed with Justine Brittle, he is  tolerating the medication. Bowels are regular. There has not been rectal bleeding. Justine Brittle denies urinary complications, the urine stream is good. Justine Brittle denies chest pain and denies increasing shortness of breath. Justine Brittle states he continues to have issues with his right ear. NO pain but continues to pop and crack with swallowing. He denies taking any medication for the problem. Hearing seems to be okay.       Past Medical History:  No date: WARD (nonalcoholic steatohepatitis)  No date: Psoriasis  No date: Psoriasis  No date: Sleep apnea    Past Surgical History:  No date: APPENDECTOMY  No date: TONSILLECTOMY    Social History    Socioeconomic History      Marital status:       Spouse name: Dalton Garza      Number of children: 3      Years of education: 13      Highest education level: Associate degree: occupational, technical, or vocational program    Occupational History        Employer: [employer]    Social Needs      Financial resource strain: Somewhat hard      Food insecurity        Worry: Never true        Inability: Never true      Transportation needs        Medical: No        Non-medical: No    Tobacco Use      Smoking status: Never Smoker      Smokeless tobacco: Never Used    Substance and Sexual Activity      Alcohol use: Not on file      Drug use: Not on file      Sexual activity: Not on file    Lifestyle      Physical activity        Days per week: Not on file        Minutes per session: Not on file      Stress: Not on file    Relationships      Social connections        Talks on phone: Not on file        Gets together: Not on file        Attends Buddhism service: Not on file        Active member of club or organization: Not on file        Attends meetings of Conjunctiva/sclera: Conjunctivae normal.   Neck:      Musculoskeletal: Normal range of motion and neck supple. Cardiovascular:      Rate and Rhythm: Normal rate and regular rhythm. Heart sounds: Normal heart sounds. Pulmonary:      Effort: Pulmonary effort is normal.      Breath sounds: Normal breath sounds. Abdominal:      Palpations: Abdomen is soft. Tenderness: There is no abdominal tenderness. Lymphadenopathy:      Cervical: No cervical adenopathy. Skin:     Findings: Rash present. Neurological:      Mental Status: He is alert. Psychiatric:         Behavior: Behavior normal.         Thought Content: Thought content normal.         Assessment:       Diagnosis Orders   1. Uncontrolled type 2 diabetes mellitus with hyperglycemia (HCC)  POCT glycosylated hemoglobin (Hb A1C)    glipiZIDE (GLUCOTROL) 5 MG tablet   2. Non-recurrent acute serous otitis media of right ear  cephALEXin (KEFLEX) 500 MG capsule   3. WARD (nonalcoholic steatohepatitis)     4. Class 3 severe obesity due to excess calories with serious comorbidity and body mass index (BMI) of 45.0 to 49.9 in adult ([address]     5. Psoriasis             Plan:      1. Non-recurrent acute serous otitis media of right ear  Take Keflex 500 mg three times a day x 14 days. Call if symptoms continues. - cephALEXin (KEFLEX) 500 MG capsule; Take 1 capsule by mouth 3 times daily for 14 days  Dispense: 42 capsule; Refill: 0    2. WARD (nonalcoholic steatohepatitis)  Continue to work on wt loss, low fat, low carb diet. 3. Class 3 severe obesity due to excess calories with serious comorbidity and body mass index (BMI) of 45.0 to 49.9 in adult ([address]      4. Psoriasis  Follows with Dermatology. 5. Uncontrolled type 2 diabetes mellitus with hyperglycemia (HCC)  Add Glucotrol 5 mg two times a day (does not have insurance to afford the newer medication.   HgbA1C in 3 months.    - POCT glycosylated hemoglobin (Hb A1C)  - glipiZIDE (GLUCOTROL) 5 MG

## 2020-09-03 ENCOUNTER — OFFICE VISIT (OUTPATIENT)
Dept: FAMILY MEDICINE CLINIC | Age: 35
End: 2020-09-03

## 2020-09-03 VITALS
BODY MASS INDEX: 44.1 KG/M2 | SYSTOLIC BLOOD PRESSURE: 126 MMHG | HEIGHT: 71 IN | HEART RATE: 80 BPM | WEIGHT: 315 LBS | DIASTOLIC BLOOD PRESSURE: 81 MMHG

## 2020-09-03 LAB — HBA1C MFR BLD: 7.6 %

## 2020-09-03 PROCEDURE — 3051F HG A1C>EQUAL 7.0%<8.0%: CPT | Performed by: INTERNAL MEDICINE

## 2020-09-03 PROCEDURE — 83036 HEMOGLOBIN GLYCOSYLATED A1C: CPT | Performed by: INTERNAL MEDICINE

## 2020-09-03 PROCEDURE — 99213 OFFICE O/P EST LOW 20 MIN: CPT | Performed by: INTERNAL MEDICINE

## 2020-09-03 ASSESSMENT — ENCOUNTER SYMPTOMS
NAUSEA: 0
BLOOD IN STOOL: 0
RESPIRATORY NEGATIVE: 1
SORE THROAT: 0
ABDOMINAL PAIN: 0
CONSTIPATION: 0
DIARRHEA: 0

## 2020-09-03 NOTE — PROGRESS NOTES
Subjective:      Patient ID: Niecy Floyd is a 30 y.o. male. Brad Murphy presents for a check up on his medical conditions DM II, Psorisis. Brad Murphy admits to new problems. Medications were reviewed with Brad Murphy, he is  tolerating the medication (hasn't been taking). Bowels are regular. There has not been rectal bleeding. Brad Murphy denies urinary complications, the urine stream is good. Brad Murphy denies chest pain and denies increasing shortness of breath. Brad Murphy states he has been having numbness in the left upper thigh over the past month. He states he is standing a lot more at work and feels this is contributing. No pain in the buttock area. Brad Murphy states he continues to feel like he has water in the right ear. Hearing seems to be stable.         Past Medical History:  No date: WARD (nonalcoholic steatohepatitis)  No date: Psoriasis  No date: Psoriasis  No date: Sleep apnea    Past Surgical History:  No date: APPENDECTOMY  No date: TONSILLECTOMY    Social History    Socioeconomic History      Marital status:       Spouse name: West Rios      Number of children: 3      Years of education: 13      Highest education level: Associate degree: occupational, technical, or vocational program    Occupational History        Employer: [employer]    Social Needs      Financial resource strain: Somewhat hard      Food insecurity        Worry: Never true        Inability: Never true      Transportation needs        Medical: No        Non-medical: No    Tobacco Use      Smoking status: Never Smoker      Smokeless tobacco: Never Used    Substance and Sexual Activity      Alcohol use: Not on file      Drug use: Not on file      Sexual activity: Not on file    Lifestyle      Physical activity        Days per week: Not on file        Minutes per session: Not on file      Stress: Not on file    Relationships      Social connections        Talks on phone: Not on file        Gets together: Not on file Attends [employer] service: Not on file        Active member of club or organization: Not on file        Attends meetings of clubs or organizations: Not on file        Relationship status: Not on file      Intimate partner violence        Fear of current or ex partner: Not on file        Emotionally abused: Not on file        Physically abused: Not on file        Forced sexual activity: Not on file    Other Topics      Concerns:        Not on file    Social History Narrative      Not on file      Review of patient's family history indicates:  Problem: No Known Problems      Relation: Mother          Age of Onset: (Not Specified)  Problem: No Known Problems      Relation: Father          Age of Onset: (Not Specified)      Current Outpatient Medications on File Prior to Visit:  glipiZIDE (GLUCOTROL) 5 MG tablet, Take 1 tablet by mouth 2 times daily, Disp: 60 tablet, Rfl: 3  Risankizumab-rzaa (SKYRIZI, 150 MG DOSE, SC), Inject into the skin every 3 months, Disp: , Rfl:   metFORMIN (GLUCOPHAGE) 1000 MG tablet, Take 1 tablet by mouth 2 times daily (with meals), Disp: 60 tablet, Rfl: 5  vitamin E 400 UNIT capsule, Take 1 capsule by mouth daily, Disp: 30 capsule, Rfl: 3    No current facility-administered medications on file prior to visit.        No Known Allergies      Lab Results       Component                Value               Date                       NA                       140                 09/10/2019                 K                        4.3                 09/10/2019                 CL                       101                 09/10/2019                 CO2                      24                  09/10/2019                 BUN                      11                  09/10/2019                 CREATININE               0.60 (L)            09/10/2019                 GLUCOSE                  166 (H)             09/10/2019                 CALCIUM                  10.2                09/10/2019 PROT                     8.4 (H)             09/10/2019                 LABALBU                  4.5                 09/10/2019                 BILITOT                  0.53                09/10/2019                 ALKPHOS                  66                  09/10/2019                 AST                      64 (H)              09/10/2019                 ALT                      105 (H)             09/10/2019                 LABGLOM                  >60                 09/10/2019                 GFRAA                    >60                 09/10/2019                 GLOB                     NOT REPORTED        01/31/2014              Lab Results       Component                Value               Date                       LABA1C                   7.6                 09/03/2020            Lab Results       Component                Value               Date                       EAG                      180                 09/10/2019              Lab Results       Component                Value               Date                       CHOL                     177                 09/10/2019                 CHOL                     183                 04/17/2019            Lab Results       Component                Value               Date                       TRIG                     180 (H)             09/10/2019                 TRIG                     152                 04/17/2019            Lab Results       Component                Value               Date                       HDL                      44                  09/10/2019                 HDL                      46                  04/17/2019            Lab Results       Component                Value               Date                       LDLCHOLESTEROL           97                  09/10/2019                 LDLCALC                  107                 04/17/2019            Lab Results       Component                Value               Date VLDL                                         09/10/2019             NOT REPORTED (H)  Lab Results       Component                Value               Date                       JEANETHHDLRRANDELL             4.0                 09/10/2019                              Diabetes   He presents for his follow-up diabetic visit. He has type 2 diabetes mellitus. His disease course has been stable. There are no hypoglycemic associated symptoms. Pertinent negatives for diabetes include no chest pain and no fatigue. There are no hypoglycemic complications. Symptoms are stable. Current diabetic treatment includes oral agent (dual therapy). He is compliant with treatment all of the time. There is no change in his home blood glucose trend. An ACE inhibitor/angiotensin II receptor blocker is not being taken. Eye exam is current. Psoriasis   This is a chronic problem. The current episode started more than 1 year ago. The problem is unchanged. The lesions are diffuse. Treatments tried: skyrizi. The treatment provided significant relief. Review of Systems   Constitutional: Negative. Negative for fatigue. HENT: Negative for congestion, ear pain, postnasal drip, sneezing and sore throat. Ear fullness     Eyes: Negative for visual disturbance. Respiratory: Negative. Cardiovascular: Negative for chest pain, palpitations and leg swelling. Gastrointestinal: Negative for abdominal pain, blood in stool, constipation, diarrhea and nausea. Genitourinary: Negative for difficulty urinating, dysuria, frequency and urgency. Musculoskeletal: Negative for arthralgias, joint swelling, myalgias, neck pain and neck stiffness. Skin: Positive for rash. Neurological: Positive for numbness. Negative for syncope. Psychiatric/Behavioral: Negative. Objective:   Physical Exam  Vitals signs and nursing note reviewed. Constitutional:       Appearance: He is well-developed. He is obese.    HENT:      Head: Atraumatic. Ears:      Comments: Both TM's with effusions. Eyes:      Conjunctiva/sclera: Conjunctivae normal.   Neck:      Musculoskeletal: Normal range of motion and neck supple. Cardiovascular:      Rate and Rhythm: Normal rate and regular rhythm. Heart sounds: Normal heart sounds. Pulmonary:      Effort: Pulmonary effort is normal.      Breath sounds: Normal breath sounds. Abdominal:      Palpations: Abdomen is soft. Tenderness: There is no abdominal tenderness. Lymphadenopathy:      Cervical: No cervical adenopathy. Skin:     Findings: No rash. Neurological:      Mental Status: He is alert. Psychiatric:         Behavior: Behavior normal.         Thought Content: Thought content normal.         Assessment:       Diagnosis Orders   1. Controlled type 2 diabetes mellitus with complication, without long-term current use of insulin ([facility])  POCT glycosylated hemoglobin (Hb A1C)   2. Class 3 severe obesity due to excess calories with serious comorbidity and body mass index (BMI) of 45.0 to 49.9 in adult ([facility] [address]     3. WARD (nonalcoholic steatohepatitis)     4. Psoriasis     5. Chronic otitis media of both ears with effusion     6. Sciatica of right side             Plan:      1. Controlled type 2 diabetes mellitus with complication, without long-term current use of insulin ([facility] [address]  Recommended that he takes all of his medication as directed. Obtain labs approximately one week prior to the office appointment. Please fast for 12 hours prior to obtaining the labs. Water or black coffee (no cream or sugar) is allowed prior to the the labs. - POCT glycosylated hemoglobin (Hb A1C)    2. Class 3 severe obesity due to excess calories with serious comorbidity and body mass index (BMI) of 45.0 to 49.9 in adult [facility])  Continue to work on wt loss and exercise. 3. WARD (nonalcoholic steatohepatitis)  Continue to work on wt loss, low fat diet, and low carbs.     4. Psoriasis  Improved on Skyrizi. 5. Chronic otitis media of both ears with effusion  Recommend Flonase 2 puffs on each side of the nose daily. Refer to ENT if this continues. 6. Sciatica of right side  Exercises printed off to perform 3 times a day. Juan Barth was instructed to follow up in the clinic in 6 months for check up or as needed with any medical issues.                       Susanna Quiroz MD

## 2020-09-03 NOTE — PATIENT INSTRUCTIONS
Survey: You may be receiving a survey from [employer] regarding your visit today. You may get this in the mail, through your MyChart or in your email. Please complete the survey to enable us to provide the highest quality of care to you and your family. Please also, mention our names. If you cannot score us as very good (5 Stars) on any question, please feel free to call the office to discuss how we could have made your experience exceptional.      Thank You! Dr. Eneida Jeong MD    [facility], [address], [place], [place]    [address]        Patient Education        Sacroiliac Pain: Exercises  Introduction  Here are some examples of exercises for you to try. The exercises may be suggested for a condition or for rehabilitation. Start each exercise slowly. Ease off the exercises if you start to have pain. You will be told when to start these exercises and which ones will work best for you. How to do the exercises  Knee-to-chest stretch   1. Do not do the knee-to-chest exercise if it causes or increases back or leg pain. 2. Lie on your back with your knees bent and your feet flat on the floor. You can put a small pillow under your head and neck if it is more comfortable. 3. Grasp your hands under one knee and bring the knee to your chest, keeping the other foot flat on the floor. 4. Keep your lower back pressed to the floor. Hold for at least 15 to 30 seconds. 5. Relax and lower the knee to the starting position. Repeat with the other leg. 6. Repeat 2 to 4 times with each leg. 7. To get more stretch, keep your other leg flat on the floor while pulling your knee to your chest.    Bridging   1. Lie on your back with both knees bent. Your knees should be bent about 90 degrees. 2. Tighten your belly muscles by pulling in your belly button toward your spine.  Then push your feet into the floor, squeeze your buttocks, and lift your hips off the floor until your shoulders, hips, and knees are all in towel in your hands. 9. Straighten your knee, and slowly pull back on the towel. You should feel a gentle stretch down the back of your leg. 10. Switch legs, and repeat steps 1 through 3.  11. Repeat 2 to 4 times. 1. Do not arch your back. 2. Do not bend either knee. 3. Keep one heel touching the floor and the other heel touching the wall. Do not point your toes. Lower abdominal strengthening   1. Lie on your back with your knees bent and your feet flat on the floor. 2. Tighten your belly muscles by pulling your belly button in toward your spine. 3. Lift one foot off the floor and bring your knee toward your chest, so that your knee is straight above your hip and your leg is bent like the letter \"L. \"  4. Lift the other knee up to the same position. 5. Lower one leg at a time to the starting position. 6. Keep alternating legs until you have lifted each leg 8 to 12 times. 7. Be sure to keep your belly muscles tight and your back still as you are moving your legs. Be sure to breathe normally. Piriformis stretch   1. Lie on your back with your legs straight. 2. Lift your affected leg, and bend your knee. With your opposite hand, reach across your body, and then gently pull your knee toward your opposite shoulder. 3. Hold the stretch for 15 to 30 seconds. 4. Switch legs and repeat steps 1 through 3.  5. Repeat 2 to 4 times. Follow-up care is a key part of your treatment and safety. Be sure to make and go to all appointments, and call your doctor if you are having problems. It's also a good idea to know your test results and keep a list of the medicines you take. Where can you learn more? Go to [email or web address] and sign in to your [employer] account. Enter [id number] in the [employer] box to learn more about \"Sacroiliac Pain: Exercises. \"     If you do not have an account, please click on the \"Sign Up Now\" link.   Current as of: March 2, 2020               Content Version: 12.5  © 4886-2237 Healthwise, Incorporated. Care instructions adapted under license by [facility] ([facility]). If you have questions about a medical condition or this instruction, always ask your healthcare professional. [address] any warranty or liability for your use of this information. 1. Controlled type 2 diabetes mellitus with complication, without long-term current use of insulin ([address]  Recommended that he takes all of his medication as directed. Obtain labs approximately one week prior to the office appointment. Please fast for 12 hours prior to obtaining the labs. Water or black coffee (no cream or sugar) is allowed prior to the the labs. - POCT glycosylated hemoglobin (Hb A1C)    2. Class 3 severe obesity due to excess calories with serious comorbidity and body mass index (BMI) of 45.0 to 49.9 in [facility])  Continue to work on wt loss and exercise. 3. WARD (nonalcoholic steatohepatitis)  Continue to work on wt loss, low fat diet, and low carbs. 4. Psoriasis  Improved on Skyrizi. 5. Chronic otitis media of both ears with effusion  Recommend Flonase 2 puffs on each side of the nose daily. 6. Sciatica of right side  Exercises printed off to perform 3 times a day. Lance Zamarripa was instructed to follow up in the clinic in 6 months for check up or as needed with any medical issues.

## 2021-09-16 ENCOUNTER — OFFICE VISIT (OUTPATIENT)
Dept: PRIMARY CARE CLINIC | Age: 36
End: 2021-09-16

## 2021-09-16 VITALS
HEIGHT: 71 IN | RESPIRATION RATE: 18 BRPM | SYSTOLIC BLOOD PRESSURE: 137 MMHG | WEIGHT: 315 LBS | TEMPERATURE: 98.3 F | DIASTOLIC BLOOD PRESSURE: 84 MMHG | OXYGEN SATURATION: 97 % | HEART RATE: 83 BPM | BODY MASS INDEX: 44.1 KG/M2

## 2021-09-16 DIAGNOSIS — H66.001 NON-RECURRENT ACUTE SUPPURATIVE OTITIS MEDIA OF RIGHT EAR WITHOUT SPONTANEOUS RUPTURE OF TYMPANIC MEMBRANE: Primary | ICD-10-CM

## 2021-09-16 PROBLEM — L40.0 PSORIASIS VULGARIS: Status: ACTIVE | Noted: 2018-02-26

## 2021-09-16 PROBLEM — B07.9 WARTS: Status: ACTIVE | Noted: 2021-09-01

## 2021-09-16 PROCEDURE — 99213 OFFICE O/P EST LOW 20 MIN: CPT | Performed by: NURSE PRACTITIONER

## 2021-09-16 RX ORDER — AMOXICILLIN 875 MG/1
875 TABLET, COATED ORAL 2 TIMES DAILY
Qty: 20 TABLET | Refills: 0 | Status: SHIPPED | OUTPATIENT
Start: 2021-09-16 | End: 2021-09-26

## 2021-09-16 ASSESSMENT — ENCOUNTER SYMPTOMS
COUGH: 0
DIARRHEA: 0
SHORTNESS OF BREATH: 0
SINUS PAIN: 0
SORE THROAT: 0
SINUS PRESSURE: 0
VOMITING: 0
WHEEZING: 0
RHINORRHEA: 1
NAUSEA: 0

## 2021-09-16 NOTE — PROGRESS NOTES
[address] [facility]  [address] [facility] [address]  Dept: [phone]  Dept Fax: [phone]     Roxanan Fiore is a 29 y.o. male who presents to the [facility] today for hismedical conditions/complaints as noted below. Roxanna Fiore is c/o of Otalgia (right side pt stated sharp pains )      HPI:     Otalgia   There is pain in the right ear. This is a new problem. The current episode started today (Started right pain this morning, getting worse as day goes on. Denies recent swimming or submerging head. ). The problem occurs constantly. The problem has been gradually worsening. There has been no fever. The pain is at a severity of 9/10. The pain is severe. Associated symptoms include rhinorrhea (Little bit.). Pertinent negatives include no coughing, diarrhea, ear discharge, headaches, hearing loss, sore throat or vomiting. He has tried NSAIDs (Ibuprofen) for the symptoms. The treatment provided no relief. There is no history of a chronic ear infection, hearing loss or a tympanostomy tube.           Past Medical History:   Diagnosis Date    WARD (nonalcoholic steatohepatitis)     Psoriasis     Psoriasis     Sleep apnea         Current Outpatient Medications   Medication Sig Dispense Refill    amoxicillin (AMOXIL) 875 MG tablet Take 1 tablet by mouth 2 times daily for 10 days 20 tablet 0    glipiZIDE (GLUCOTROL) 5 MG tablet Take 1 tablet by mouth 2 times daily (Patient not taking: Reported on 9/16/2021) 60 tablet 3    Risankizumab-rzaa (SKYRIZI, 150 MG DOSE, SC) Inject into the skin every 3 months (Patient not taking: Reported on 9/16/2021)      metFORMIN (GLUCOPHAGE) 1000 MG tablet Take 1 tablet by mouth 2 times daily (with meals) (Patient not taking: Reported on 9/16/2021) 60 tablet 5    vitamin E 400 UNIT capsule Take 1 capsule by mouth daily (Patient not taking: Reported on 9/16/2021) 30 capsule 3     No current facility-administered medications for this visit. No Known Allergies    Subjective:     Review of Systems   Constitutional: Negative for appetite change, chills, diaphoresis, fatigue and fever. HENT: Positive for ear pain and rhinorrhea (Little bit.). Negative for ear discharge, hearing loss, sinus pressure, sinus pain and sore throat. Respiratory: Negative for cough, shortness of breath and wheezing. Gastrointestinal: Negative for diarrhea, nausea and vomiting. Neurological: Negative for dizziness, light-headedness and headaches. Objective:      Physical Exam  Vitals and nursing note reviewed. Constitutional:       General: He is not in acute distress. Appearance: Normal appearance. He is well-developed. He is not ill-appearing or diaphoretic. Comments: Well hydrated, nontoxic appearance. HENT:      Head: Normocephalic and atraumatic. Right Ear: Hearing, ear canal and external ear normal. No drainage. A middle ear effusion (Large bubble of yellow fluid behind TM.) is present. There is no impacted cerumen. No mastoid tenderness. Tympanic membrane is injected, erythematous and bulging. Left Ear: Hearing, tympanic membrane, ear canal and external ear normal. No drainage. No middle ear effusion. There is no impacted cerumen. No mastoid tenderness. Tympanic membrane is not injected, erythematous or bulging. Nose: Nose normal. No mucosal edema, congestion or rhinorrhea. Right Sinus: No maxillary sinus tenderness or frontal sinus tenderness. Left Sinus: No maxillary sinus tenderness or frontal sinus tenderness. Mouth/Throat:      Lips: Pink. Mouth: Mucous membranes are moist.      Pharynx: Oropharynx is clear. Uvula midline. No pharyngeal swelling, oropharyngeal exudate, posterior oropharyngeal erythema or uvula swelling. Eyes:      Conjunctiva/sclera: Conjunctivae normal.      Pupils: Pupils are equal, round, and reactive to light.    Cardiovascular:      Rate and Rhythm: Normal rate and regular rhythm. Heart sounds: Normal heart sounds, S1 normal and S2 normal. No murmur heard. No friction rub. No gallop. Pulmonary:      Effort: Pulmonary effort is normal. No accessory muscle usage or respiratory distress. Breath sounds: Normal breath sounds and air entry. No decreased breath sounds, wheezing, rhonchi or rales. Comments: No cough. Breath sounds clear B/L anterior and posterior lobes. Chest expansion symmetrical.  No audible wheezing or respiratory distress. No rales or rhonchi. Musculoskeletal:      Cervical back: Neck supple. Lymphadenopathy:      Cervical: Cervical adenopathy present. Right cervical: Superficial cervical adenopathy present. No posterior cervical adenopathy. Left cervical: Superficial cervical adenopathy present. No posterior cervical adenopathy. Skin:     General: Skin is warm and dry. Coloration: Skin is not pale. Findings: No erythema or rash. Neurological:      Mental Status: He is alert and oriented to person, place, and time. Psychiatric:         Behavior: Behavior normal. Behavior is cooperative. /84   Pulse 83   Temp 98.3 °F (36.8 °C) (Oral)   Resp 18   Ht 5' 11\" (1.803 m)   Wt (!) 336 lb 11.2 oz (152.7 kg)   SpO2 97%   BMI 46.96 kg/m²     Assessment:      Diagnosis Orders   1. Non-recurrent acute suppurative otitis media of right ear without spontaneous rupture of tympanic membrane  amoxicillin (AMOXIL) 875 MG tablet       Plan:      Return if symptoms worsen or fail to improve, for Resume all previous medications as directed. Orders Placed This Encounter   Medications    amoxicillin (AMOXIL) 875 MG tablet     Sig: Take 1 tablet by mouth 2 times daily for 10 days     Dispense:  20 tablet     Refill:  0        · Practice meticulous handwashing and cover cough to prevent spread of infection  · Encouraged to increase fluids and rest   · Continue antibiotic as prescribed until all doses completed. · Tylenol/Ibuprofen OTC PRN for pain, discomfort or fever as directed on package according to age/weight. · Warm compresses to ear to relieve discomfort  · Elevate head of bed or use pillow. · Patient instructions given for otitis media and amoxicillin. · To ER or call 911 if any difficulty breathing, shortness of breath, inability to swallow, hives, rash, facial/tongue swelling or temp greater than 103 degrees. · Follow up with PCP as needed if symptoms worsen or do not improve. Oli Summers received counseling on the following healthy behaviors: medication adherence. Patient given educational materials - see patient instructions. Discussed use,benefit, and side effects of prescribed medications. Treatment plan discussed at visit. Continue routine health care follow up. All patient questions answered. Pt voiced understanding.       Electronically signed by DORETHA Camacho CNP on 9/16/2021 at 4:51 PM

## 2021-09-16 NOTE — PATIENT INSTRUCTIONS
Patient Education        Ear Infection (Otitis Media): Care Instructions  Overview     An ear infection may start with a cold and affect the middle ear (otitis media). It can hurt a lot. Most ear infections clear up on their own in a couple of days and do not need antibiotics. Also, antibiotics do not work against viruses, which may be the cause of your infection. Regular doses of pain relievers are the best way to reduce your fever and help you feel better. Follow-up care is a key part of your treatment and safety. Be sure to make and go to all appointments, and call your doctor if you are having problems. It's also a good idea to know your test results and keep a list of the medicines you take. How can you care for yourself at home? · Take pain medicines exactly as directed. ? If the doctor gave you a prescription medicine for pain, take it as prescribed. ? If you are not taking a prescription pain medicine, take an over-the-counter medicine, such as acetaminophen (Tylenol), ibuprofen (Advil, Motrin), or naproxen (Aleve). Read and follow all instructions on the label. ? Do not take two or more pain medicines at the same time unless the doctor told you to. Many pain medicines have acetaminophen, which is Tylenol. Too much acetaminophen (Tylenol) can be harmful. · Plan to take a full dose of pain reliever before bedtime. Getting enough sleep will help you get better. · Try a warm, moist washcloth on the ear. It may help relieve pain. · If your doctor prescribed antibiotics, take them as directed. Do not stop taking them just because you feel better. You need to take the full course of antibiotics. When should you call for help? Call your doctor now or seek immediate medical care if:    · You have new or increasing ear pain.     · You have new or increasing pus or blood draining from your ear.     · You have a fever with a stiff neck or a severe headache.    Watch closely for changes in your health, and be sure to contact your doctor if:    · You have new or worse symptoms.     · You are not getting better after taking an antibiotic for 2 days. Where can you learn more? Go to https://[employer] org and sign in to your [employer] account. Enter F587 in the [facility] box to learn more about \"Ear Infection (Otitis Media): Care Instructions. \"     If you do not have an account, please click on the \"Sign Up Now\" link. Current as of: December 2, 2020               Content Version: 12.9  © 2006-2021 [employer]. Care instructions adapted under license by [facility] ([facility]). If you have questions about a medical condition or this instruction, always ask your healthcare professional. [address] any warranty or liability for your use of this information. Patient Education        amoxicillin  Pronunciation:  am OX i luca in  What is the most important information I should know about amoxicillin? You should not use this medicine if you are allergic to any penicillin antibiotic. What is amoxicillin? Amoxicillin is a penicillin antibiotic that is used to treat many different types of infection caused by bacteria, such as tonsillitis, bronchitis, pneumonia, and infections of the ear, nose, throat, skin, or urinary tract. Amoxicillin is also sometimes used together with another antibiotic called clarithromycin (Biaxin) to treat stomach ulcers caused by Helicobacter pylori infection. This combination is sometimes used with a stomach acid reducer called lansoprazole (Prevacid). Amoxicillin may also be used for purposes not listed in this medication guide. What should I discuss with my healthcare provider before taking amoxicillin? You should not use this medicine if you are allergic to any penicillin antibiotic, such as ampicillin, dicloxacillin, oxacillin, penicillin, or ticarcillin.   Tell your doctor if you have ever had:  · kidney disease;  · mononucleosis (also called \"mono\");  · diarrhea caused by taking antibiotics; or  · food or drug allergies (especially to a cephalosporin antibiotic such as Omnicef, Cefzil, Ceftin, Keflex, and others). It is not known whether this medicine will harm an unborn baby. Tell your doctor if you are pregnant or plan to become pregnant. Amoxicillin can make birth control pills less effective. Ask your doctor about using a non-hormonal birth control (condom, diaphragm, cervical cap, or contraceptive sponge) to prevent pregnancy. It may not be safe to breastfeed while using this medicine. Ask your doctor about any risk. How should I take amoxicillin? Follow all directions on your prescription label and read all medication guides or instruction sheets. Use the medicine exactly as directed. Take this medicine at the same time each day. Some forms of amoxicillin may be taken with or without food. Check your medicine label to see if you should take your amoxicillin with food or not. Shake the oral suspension (liquid) before you measure a dose. Measure liquid medicine with the dosing syringe provided, or use a medicine dose-measuring device (not a kitchen spoon). You may mix the liquid with water, milk, baby formula, fruit juice, or ginger ale. Drink all of the mixture right away. Do not save for later use. You must chew the chewable tablet before you swallow it. Swallow the regular tablet whole and do not crush, chew, or break it. You will need frequent medical tests. If you are taking amoxicillin with clarithromycin and/or lansoprazole to treat stomach ulcer, use all of your medications as directed. Read the medication guide or patient instructions provided with each medication. Do not change your doses or medication schedule without your doctor's advice. Use this medicine for the full prescribed length of time, even if your symptoms quickly improve.  Skipping doses can increase your risk of infection that is resistant to medication. Amoxicillin will not treat a viral infection such as the flu or a common cold. Do not share this medicine with another person, even if they have the same symptoms you have. This medicine can affect the results of certain medical tests. Tell any doctor who treats you that you are using amoxicillin. Store at room temperature away from moisture, heat, and light. You may store liquid amoxicillin in a refrigerator but do not allow it to freeze. Throw away any liquid amoxicillin that is not used within 14 days after it was mixed at the pharmacy. What happens if I miss a dose? Skip the missed dose and use your next dose at the regular time. Do not use two doses at one time. What happens if I overdose? Seek emergency medical attention or call the Poison Help line at 1-660.387.7424. What should I avoid while taking amoxicillin? Antibiotic medicines can cause diarrhea, which may be a sign of a new infection. If you have diarrhea that is watery or bloody, call your doctor before using anti-diarrhea medicine. What are the possible side effects of amoxicillin? Get emergency medical help if you have signs of an allergic reaction (hives, difficult breathing, swelling in your face or throat) or a severe skin reaction (fever, sore throat, burning eyes, skin pain, red or purple skin rash with blistering and peeling). Call your doctor at once if you have:  · severe stomach pain; or  · diarrhea that is watery or bloody (even if it occurs months after your last dose). Common side effects may include:  · nausea, vomiting, diarrhea; or  · rash. This is not a complete list of side effects and others may occur. Call your doctor for medical advice about side effects. You may report side effects to FDA at 6-638-JAA-1012. What other drugs will affect amoxicillin?   Tell your doctor about all your other medicines, especially:  · any other antibiotics;  · allopurinol;  · probenecid; or  · a blood thinner --warfarin, Tanishaadin, Jantoven. This list is not complete. Other drugs may affect amoxicillin, including prescription and over-the-counter medicines, vitamins, and herbal products. Not all possible drug interactions are listed here. Where can I get more information? Your pharmacist can provide more information about amoxicillin. Remember, keep this and all other medicines out of the reach of children, never share your medicines with others, and use this medication only for the indication prescribed. Every effort has been made to ensure that the information provided by [address] is accurate, up-to-date, and complete, but no guarantee is made to that effect. Drug information contained herein may be time sensitive. [facility] information has been compiled for use by healthcare practitioners and consumers in the [facility] and therefore [facility] does not warrant that uses outside of the [facility] are appropriate, unless specifically indicated otherwise. [facility]'s drug information does not endorse drugs, diagnose patients or recommend therapy. [facility] drug information is an informational resource designed to assist licensed healthcare practitioners in caring for their patients and/or to serve consumers viewing this service as a supplement to, and not a substitute for, the expertise, skill, knowledge and judgment of healthcare practitioners. The absence of a warning for a given drug or drug combination in no way should be construed to indicate that the drug or drug combination is safe, effective or appropriate for any given patient. [facility] does not assume any responsibility for any aspect of healthcare administered with the aid of information [facility] provides. The information contained herein is not intended to cover all possible uses, directions, precautions, warnings, drug interactions, allergic reactions, or adverse effects.  If you have questions about the drugs you are taking, check with your doctor, nurse or pharmacist.  Copyright 3983-3638 [employer]. Version: 10.01. Revision date: 11/26/2019. Care instructions adapted under license by [facility] ([facility]). If you have questions about a medical condition or this instruction, always ask your healthcare professional. [address]  any warranty or liability for your use of this information. · Practice meticulous handwashing and cover cough to prevent spread of infection  · Encouraged to increase fluids and rest   · Continue antibiotic as prescribed until all doses completed. · Tylenol/Ibuprofen OTC PRN for pain, discomfort or fever as directed on package according to age/weight. · Warm compresses to ear to relieve discomfort  · Elevate head of bed or use pillow. · Patient instructions given for otitis media and amoxicillin. · To ER or call 911 if any difficulty breathing, shortness of breath, inability to swallow, hives, rash, facial/tongue swelling or temp greater than 103 degrees. · Follow up with PCP as needed if symptoms worsen or do not improve.

## 2022-01-07 ENCOUNTER — OFFICE VISIT (OUTPATIENT)
Dept: FAMILY MEDICINE CLINIC | Age: 37
End: 2022-01-07
Payer: COMMERCIAL

## 2022-01-07 ENCOUNTER — HOSPITAL ENCOUNTER (OUTPATIENT)
Dept: GENERAL RADIOLOGY | Age: 37
Discharge: HOME | End: 2022-01-09
Payer: COMMERCIAL

## 2022-01-07 ENCOUNTER — HOSPITAL ENCOUNTER (OUTPATIENT)
Age: 37
Discharge: HOME | End: 2022-01-09
Payer: COMMERCIAL

## 2022-01-07 VITALS
SYSTOLIC BLOOD PRESSURE: 138 MMHG | WEIGHT: 315 LBS | DIASTOLIC BLOOD PRESSURE: 80 MMHG | BODY MASS INDEX: 42.66 KG/M2 | HEIGHT: 72 IN

## 2022-01-07 DIAGNOSIS — M25.511 ACUTE PAIN OF RIGHT SHOULDER: ICD-10-CM

## 2022-01-07 DIAGNOSIS — M25.511 ACUTE PAIN OF RIGHT SHOULDER: Primary | ICD-10-CM

## 2022-01-07 DIAGNOSIS — E11.65 UNCONTROLLED TYPE 2 DIABETES MELLITUS WITH HYPERGLYCEMIA (HCC): ICD-10-CM

## 2022-01-07 DIAGNOSIS — L40.0 PSORIASIS VULGARIS: ICD-10-CM

## 2022-01-07 DIAGNOSIS — E66.01 CLASS 3 SEVERE OBESITY DUE TO EXCESS CALORIES WITH SERIOUS COMORBIDITY AND BODY MASS INDEX (BMI) OF 45.0 TO 49.9 IN ADULT (HCC): ICD-10-CM

## 2022-01-07 DIAGNOSIS — K75.81 NASH (NONALCOHOLIC STEATOHEPATITIS): ICD-10-CM

## 2022-01-07 PROBLEM — E11.8 CONTROLLED TYPE 2 DIABETES MELLITUS WITH COMPLICATION, WITHOUT LONG-TERM CURRENT USE OF INSULIN (HCC): Status: RESOLVED | Noted: 2020-01-10 | Resolved: 2022-01-07

## 2022-01-07 PROCEDURE — 99214 OFFICE O/P EST MOD 30 MIN: CPT | Performed by: INTERNAL MEDICINE

## 2022-01-07 PROCEDURE — 2022F DILAT RTA XM EVC RTNOPTHY: CPT | Performed by: INTERNAL MEDICINE

## 2022-01-07 PROCEDURE — 1036F TOBACCO NON-USER: CPT | Performed by: INTERNAL MEDICINE

## 2022-01-07 PROCEDURE — 73030 X-RAY EXAM OF SHOULDER: CPT

## 2022-01-07 PROCEDURE — G8417 CALC BMI ABV UP PARAM F/U: HCPCS | Performed by: INTERNAL MEDICINE

## 2022-01-07 PROCEDURE — 3046F HEMOGLOBIN A1C LEVEL >9.0%: CPT | Performed by: INTERNAL MEDICINE

## 2022-01-07 PROCEDURE — G8427 DOCREV CUR MEDS BY ELIG CLIN: HCPCS | Performed by: INTERNAL MEDICINE

## 2022-01-07 PROCEDURE — G8484 FLU IMMUNIZE NO ADMIN: HCPCS | Performed by: INTERNAL MEDICINE

## 2022-01-07 RX ORDER — METFORMIN HYDROCHLORIDE 500 MG/1
500 TABLET, EXTENDED RELEASE ORAL
Qty: 30 TABLET | Refills: 5 | Status: SHIPPED | OUTPATIENT
Start: 2022-01-07 | End: 2022-07-14

## 2022-01-07 SDOH — ECONOMIC STABILITY: FOOD INSECURITY: WITHIN THE PAST 12 MONTHS, YOU WORRIED THAT YOUR FOOD WOULD RUN OUT BEFORE YOU GOT MONEY TO BUY MORE.: NEVER TRUE

## 2022-01-07 SDOH — ECONOMIC STABILITY: FOOD INSECURITY: WITHIN THE PAST 12 MONTHS, THE FOOD YOU BOUGHT JUST DIDN'T LAST AND YOU DIDN'T HAVE MONEY TO GET MORE.: NEVER TRUE

## 2022-01-07 ASSESSMENT — ENCOUNTER SYMPTOMS
BLOOD IN STOOL: 0
DIARRHEA: 0
SORE THROAT: 0
NAUSEA: 0
CONSTIPATION: 0
RESPIRATORY NEGATIVE: 1
ABDOMINAL PAIN: 0

## 2022-01-07 ASSESSMENT — PATIENT HEALTH QUESTIONNAIRE - PHQ9
SUM OF ALL RESPONSES TO PHQ9 QUESTIONS 1 & 2: 0
1. LITTLE INTEREST OR PLEASURE IN DOING THINGS: 0
SUM OF ALL RESPONSES TO PHQ QUESTIONS 1-9: 0
2. FEELING DOWN, DEPRESSED OR HOPELESS: 0

## 2022-01-07 ASSESSMENT — SOCIAL DETERMINANTS OF HEALTH (SDOH): HOW HARD IS IT FOR YOU TO PAY FOR THE VERY BASICS LIKE FOOD, HOUSING, MEDICAL CARE, AND HEATING?: NOT HARD AT ALL

## 2022-01-07 NOTE — PROGRESS NOTES
Sami Orellana (:  1985) is a 29 y.o. male,Established patient, here for evaluation of the following chief complaint(s):  Diabetes Mellitus (check up) and Shoulder Pain (complains of right shoulder pain for 2 months)         ASSESSMENT/PLAN:  1. Acute pain of right shoulder  -     XR SHOULDER RIGHT (MIN 2 VIEWS); Future  -     [facility]  2. Uncontrolled type 2 diabetes mellitus with hyperglycemia (HCC)  -     metFORMIN (GLUCOPHAGE XR) 500 MG extended release tablet; Take 1 tablet by mouth daily (with breakfast), Disp-30 tablet, R-5Normal  -     SITagliptin (JANUVIA) 100 MG tablet; Take 1 tablet by mouth daily, Disp-30 tablet, R-5Normal  -     Comprehensive Metabolic Panel; Future  -     Hemoglobin A1C; Future  -     Lipid Panel; Future  -     Microalbumin / Creatinine Urine Ratio; Future  3. Class 3 severe obesity due to excess calories with serious comorbidity and body mass index (BMI) of 45.0 to 49.9 in adult ([facility] [address]  4. Psoriasis vulgaris  5. WARD (nonalcoholic steatohepatitis)      Plan:  1. Acute pain of right shoulder  Xray the right shoulder. Hi Haney will be referred to Physical Therapy at [facility]. Please call to schedule an appointment  at (.    - XR SHOULDER RIGHT (MIN 2 VIEWS); Future  - [facility] Physical Therapy - [facility]    2. Uncontrolled type 2 diabetes mellitus with hyperglycemia ([facility] [address]  Start on Januvia 100 mg daily. Take Metformin  mg daily. HgbA1C in 3 months. - metFORMIN (GLUCOPHAGE XR) 500 MG extended release tablet; Take 1 tablet by mouth daily (with breakfast)  Dispense: 30 tablet; Refill: 5  - SITagliptin (JANUVIA) 100 MG tablet; Take 1 tablet by mouth daily  Dispense: 30 tablet; Refill: 5  - Comprehensive Metabolic Panel; Future  - Hemoglobin A1C; Future  - Lipid Panel; Future    3.  Class 3 severe obesity due to excess calories with serious comorbidity and body mass index (BMI) of 45.0 to 49.9 in adult [facility])  Continue to work on wt loss / exercise. 4. Psoriasis vulgaris  Follows with Dermatology. 5. WARD (nonalcoholic steatohepatitis)  Continue to work on wt loss and low fat diet. Decrease carbs in the diet. Josette Jiang was instructed to follow up in the clinic in 3 months for check up or as needed with any medical issues. Subjective   SUBJECTIVE/OBJECTIVE:  Froy Griggs presents for a check up on his medical conditions DM II. Froy Griggs admits to new problems. Medications were reviewed with Froy Griggs, is not taking any of his medication. Bowels are regular. There has not been rectal bleeding. Froy Griggs denies urinary complications, the urine stream is good. Froy Griggs denies chest pain and denies increasing shortness of breath. Froy Griggs has been having right shoulder pain for a few months. This is worse during the night and will improve during the day. No known injury. He sleeps on his back at night. Ibuprofen and tylenol has not helped. He states he has had an injection in the shoulder in the past.  No recent xray.       Past Medical History:  No date: WARD (nonalcoholic steatohepatitis)  No date: Psoriasis  No date: Psoriasis  No date: Sleep apnea    Past Surgical History:  No date: APPENDECTOMY  No date: TONSILLECTOMY    Social History    Socioeconomic History      Marital status:       Spouse name: Miriam Moreno      Number of children: 3      Years of education: 13      Highest education level: Associate degree: occupational, technical, or vocational program    Occupational History        Employer: [employer]    Tobacco Use      Smoking status: Never Smoker      Smokeless tobacco: Never Used    Vaping Use      Vaping Use: Never used    Substance and Sexual Activity      Alcohol use: Not on file      Drug use: Not on file      Sexual activity: Not on file    Other Topics      Concerns:        Not on file    Social History Narrative      Not on file    Social Determinants of Health  Financial Resource Strain: Low Risk       Difficulty of Paying Living Expenses: Not hard at all  Food Insecurity: No Food Insecurity      [facility] the Last Year: Never true      Ran Out of Food in the Last Year: Never true  Transportation Needs:       Lack of Transportation (Medical): Not on file      Lack of Transportation (Non-Medical):  Not on file  Physical Activity:       Days of Exercise per Week: Not on file      Minutes of Exercise per Session: Not on file  Stress:       Feeling of Stress : Not on file  Social Connections:       Frequency of Communication with Friends and Family: Not on file      Frequency of Social Gatherings with Friends and Family: Not on file      Attends [employer] Services: Not on file      Active Member of [address] [place] [address] or Organizations: Not on file      Attends Club or Organization Meetings: Not on file      Marital Status: Not on file  Intimate Partner Violence:       Fear of Current or Ex-Partner: Not on file      Emotionally Abused: Not on file      Physically Abused: Not on file      Sexually Abused: Not on file  Housing Stability:       Unable to Pay for Housing in the Last Year: Not on file      Number of [place] in the Last Year: Not on file      Unstable Housing in the Last Year: Not on file    Review of patient's family history indicates:  Problem: No Known Problems      Relation: Mother          Age of Onset: (Not Specified)  Problem: No Known Problems      Relation: Father          Age of Onset: (Not Specified)      Current Outpatient Medications on File Prior to Visit:  hydrocortisone 2.5 % ointment, apply to affected area TWICE DAILY AS NEEDED TO PSORIASIS ON THE PENIS, Disp: , Rfl:   Risankizumab-rzaa (SKYRIZI, 150 MG DOSE, SC), Inject into the skin every 3 months , Disp: , Rfl:   glipiZIDE (GLUCOTROL) 5 MG tablet, Take 1 tablet by mouth 2 times daily (Patient not taking: Reported on 9/16/2021), Disp: 60 tablet, Rfl: 3  metFORMIN (GLUCOPHAGE) 1000 MG tablet, Take 1 tablet by mouth 2 times daily (with meals) (Patient not taking: Reported on 9/16/2021), Disp: 60 tablet, Rfl: 5  vitamin E 400 UNIT capsule, Take 1 capsule by mouth daily (Patient not taking: Reported on 9/16/2021), Disp: 30 capsule, Rfl: 3    No current facility-administered medications on file prior to visit.       No Known Allergies      Lab Results       Component                Value               Date                       NA                       140                 09/10/2019                 K                        4.3                 09/10/2019                 CL                       101                 09/10/2019                 CO2                      24                  09/10/2019                 BUN                      11                  09/10/2019                 CREATININE               0.60 (L)            09/10/2019                 GLUCOSE                  166 (H)             09/10/2019                 CALCIUM                  10.2                09/10/2019                 PROT                     8.4 (H)             09/10/2019                 LABALBU                  4.5                 09/10/2019                 BILITOT                  0.53                09/10/2019                 ALKPHOS                  66                  09/10/2019                 AST                      64 (H)              09/10/2019                 ALT                      105 (H)             09/10/2019                 LABGLOM                  >60                 09/10/2019                 GFRAA                    >60                 09/10/2019                 GLOB                     NOT REPORTED        01/31/2014              Lab Results       Component                Value               Date                       LABA1C                   7.6                 09/03/2020            Lab Results       Component                Value               Date                       EAG                      180 09/10/2019              Lab Results       Component                Value               Date                       CHOL                     177                 09/10/2019                 CHOL                     183                 04/17/2019            Lab Results       Component                Value               Date                       TRIG                     180 (H)             09/10/2019                 TRIG                     152                 04/17/2019            Lab Results       Component                Value               Date                       HDL                      44                  09/10/2019                 HDL                      46                  04/17/2019            Lab Results       Component                Value               Date                       LDLCHOLESTEROL           97                  09/10/2019                 LDLCALC                  107                 04/17/2019            Lab Results       Component                Value               Date                       VLDL                                         09/10/2019             NOT REPORTED (H)  Lab Results       Component                Value               Date                       CHOLHDLRATIO             4.0                 09/10/2019                                Review of Systems   Constitutional: Negative. HENT: Negative for congestion, ear pain, postnasal drip, sneezing and sore throat. Eyes: Negative for visual disturbance. Respiratory: Negative. Cardiovascular: Negative for chest pain, palpitations and leg swelling. Gastrointestinal: Negative for abdominal pain, blood in stool, constipation, diarrhea and nausea. Genitourinary: Negative for difficulty urinating, dysuria, frequency and urgency. Musculoskeletal: Negative for arthralgias, joint swelling, myalgias, neck pain and neck stiffness. Skin: Negative. Neurological: Negative for syncope.    Psychiatric/Behavioral: Negative. Objective   Physical Exam  Vitals and nursing note reviewed. Constitutional:       Appearance: He is well-developed. He is obese. HENT:      Head: Atraumatic. Eyes:      Conjunctiva/sclera: Conjunctivae normal.   Cardiovascular:      Rate and Rhythm: Normal rate and regular rhythm. Heart sounds: Normal heart sounds. Pulmonary:      Effort: Pulmonary effort is normal.      Breath sounds: Normal breath sounds. Abdominal:      Palpations: Abdomen is soft. Tenderness: There is no abdominal tenderness. Musculoskeletal:      Cervical back: Normal range of motion and neck supple. Lymphadenopathy:      Cervical: No cervical adenopathy. Skin:     Findings: No rash. Neurological:      Mental Status: He is alert. Psychiatric:         Behavior: Behavior normal.         Thought Content: Thought content normal.                  An electronic signature was used to authenticate this note.     --Consuelo Loja MD

## 2022-01-07 NOTE — PATIENT INSTRUCTIONS
Survey: You may be receiving a survey from [employer] regarding your visit today. You may get this in the mail, through your MyChart or in your email. Please complete the survey to enable us to provide the highest quality of care to you and your family. Please also, mention our names. If you cannot score us as very good (5 Stars) on any question, please feel free to call the office to discuss how we could have made your experience exceptional.      Thank You! MD Yvonne Troncoso, JOSE Hooker, Mikael Gómez, GARRETTN RN    Effie Zaki, [facility]      1. Acute pain of right shoulder  Xray the right shoulder. Froy Griggs will be referred to Physical Therapy at [facility]. Please call to schedule an appointment  at (.    - XR SHOULDER RIGHT (MIN 2 VIEWS); Future  - [facility] Physical Therapy - [place] [place]    2. Uncontrolled type 2 diabetes mellitus with hyperglycemia ([address]  Start on Januvia 100 mg daily. Take Metformin  mg daily. HgbA1C in 3 months. - metFORMIN (GLUCOPHAGE XR) 500 MG extended release tablet; Take 1 tablet by mouth daily (with breakfast)  Dispense: 30 tablet; Refill: 5  - SITagliptin (JANUVIA) 100 MG tablet; Take 1 tablet by mouth daily  Dispense: 30 tablet; Refill: 5  - Comprehensive Metabolic Panel; Future  - Hemoglobin A1C; Future  - Lipid Panel; Future    3. Class 3 severe obesity due to excess calories with serious comorbidity and body mass index (BMI) of 45.0 to 49.9 in [facility])  Continue to work on wt loss / exercise. 4. Psoriasis vulgaris  Follows with Dermatology. 5. WARD (nonalcoholic steatohepatitis)  Continue to work on wt loss and low fat diet. Decrease carbs in the diet. Josette Jiang was instructed to follow up in the clinic in 3 months for check up or as needed with any medical issues.

## 2022-01-10 DIAGNOSIS — E11.65 UNCONTROLLED TYPE 2 DIABETES MELLITUS WITH HYPERGLYCEMIA (HCC): Primary | ICD-10-CM

## 2022-01-10 RX ORDER — PIOGLITAZONEHYDROCHLORIDE 15 MG/1
15 TABLET ORAL DAILY
Qty: 30 TABLET | Refills: 3 | Status: SHIPPED | OUTPATIENT
Start: 2022-01-10 | End: 2022-04-12 | Stop reason: ALTCHOICE

## 2022-02-08 ENCOUNTER — HOSPITAL ENCOUNTER (OUTPATIENT)
Dept: PHYSICAL THERAPY | Age: 37
Setting detail: THERAPIES SERIES
Discharge: HOME | End: 2022-02-08
Payer: COMMERCIAL

## 2022-02-08 PROCEDURE — 97162 PT EVAL MOD COMPLEX 30 MIN: CPT

## 2022-02-08 ASSESSMENT — PAIN SCALES - GENERAL: PAINLEVEL_OUTOF10: 4

## 2022-02-08 NOTE — PROGRESS NOTES
Internal Rotation: 5/5  R Shoulder External Rotation: 5/5    AROM RUE (degrees)  RUE AROM : WFL  RUE General AROM: Discomfort noted at or above 90 deg flexion and abduction;  moderate discomfort with full IR behind back     PROM RUE (degrees)  RUE PROM: WNL  RUE General PROM: Discomfort with end range flexion and abduction;   no pain with full IR/ER at 90 deg                                     Assessment  Assessment: Patient presents with acute right shoulder pain suggestive of rotator cuff dysfunction.    Plan to progress with postural stretching and strengthening to improve mobility and use of right arm without pain  Prognosis: Good  Decision Making: Medium Complexity  Exam: UEFS =  56/80    Clinical Presentation:  Evolving  The Following Comorbities will impact the patients progression and Plan of Care:   Diabetes and Obesity       Activity Tolerance: Patient Tolerated treatment well    Education: PT POC;  issuef written home program Access Code [email or web address]          Goals  Short term goals  Time Frame for Short term goals: 6 visits  Short term goal 1: Educate on right shoulder postural stretching and strengthening    Long term goals  Time Frame for Long term goals : 14 visits  Long term goal 1: AROM right shoulder without discomfort  Long term goal 2: Be able to sleep on right shoulder >3 hours  Long term goal 3: Complete lifting/holding up to 10# right UE  (hold  without discomfort)  Long term goal 4: Complete self-care ofreaching behind back and household tasks of lifting without discomfort    Patient's Goal:    Get rid of the shoulder pain and resume activity    Timed Code Treatment Minutes: 0 Minutes  Total Treatment Time: 50     Time In: 5947  Time Out: 4895    MIGUE OJEDA PT Date: 2022

## 2022-02-08 NOTE — PLAN OF CARE
[facility]       Phone: [phone]   Date: 2022                      Outpatient Physical Therapy  Fax: [phone]    ACCT #: [de-identified]                     Plan of Care  [facility]#: [id number]  Patient: Sami Orellana  : 1985    Referring Practitioner: Dr. Saira Abbott    Referral Date : 22    Diagnosis: Right shoulder pain  Onset Date: 22  Treatment Diagnosis: Right shoulder pain    Assessment: Patient presents with acute right shoulder pain suggestive of rotator cuff dysfunction. Plan to progress with postural stretching and strengthening to improve mobility and use of right arm without pain  Prognosis: Good    Treatment Plan :  Days: 2 times per week Weeks: 8 weeks Total # of Visits Approved: 20    Patient Education/HEP, Therapeutic Exercise and Manual Therapy     Goals  Time Frame for Short term goals: 6 visits  Short term goal 1: Educate on right shoulder postural stretching and strengthening    Time Frame for Long term goals : 14 visits  Long term goal 1: AROM right shoulder without discomfort  Long term goal 2: Be able to sleep on right shoulder >3 hours  Long term goal 3: Complete lifting/holding up to 10# right UE  (hold  without discomfort)  Long term goal 4: Complete self-care ofreaching behind back and household tasks of lifting without discomfort     MIGUE OJEDA, KHRIS   Date: 2022    ______________________________________ Date: 2022  Physician Signature  By signing above or cosigning electronically, I have reviewed this Plan of Care and certify a need for medically necessary rehabilitation services.

## 2022-02-11 ENCOUNTER — HOSPITAL ENCOUNTER (OUTPATIENT)
Dept: PHYSICAL THERAPY | Age: 37
Setting detail: THERAPIES SERIES
Discharge: HOME | End: 2022-02-11
Payer: COMMERCIAL

## 2022-02-11 PROCEDURE — 97140 MANUAL THERAPY 1/> REGIONS: CPT

## 2022-02-11 PROCEDURE — 97110 THERAPEUTIC EXERCISES: CPT

## 2022-02-11 ASSESSMENT — PAIN SCALES - GENERAL: PAINLEVEL_OUTOF10: 5

## 2022-02-11 NOTE — PROGRESS NOTES
Phone: [facility]      Fax: [phone]                            Outpatient Physical Therapy                                                                            Daily Note    Date: 2022  Patient Name: Shirin Colindres        MRN: [id number]   ACCT#:  [de-identified]  : 1985  (29 y.o.)    Referring Practitioner: Dr. Miriam Kinsey    Referral Date : 22    Diagnosis: Right shoulder pain  Treatment Diagnosis: Right shoulder pain    Onset Date: 22  PT Insurance Information: Trista Frye  Total # of Visits Approved: 20 Per Physician Order  Total # of Visits to Date: 2  No Show: 0  Canceled Appointment: 0  Plan of Care/Certification Expiration Date: 22    Pre-Treatment Pain:  5/10     Assessment  Assessment: Patient reports R shoulder pain is a 5/10 today. Patient states he was sore yesterday. Initiated strengthening and AROM exercises with no complaint of increased pain from patient. Following manual patient reports pain decreased to a 2-3/10.    Chart Reviewed: Yes    Plan  Plan: Continue with current plan    Exercises/Modalities/Manual:  See DocFlow Sheet    Education:     Goals  (Total # of Visits to Date: 2)   Short Term Goals - Time Frame for Short term goals: 6 visits  Short term goal 1: Educate on right shoulder postural stretching and strengthening    Long Term Goals - Time Frame for Long term goals : 14 visits  Long term goal 1: AROM right shoulder without discomfort  Long term goal 2: Be able to sleep on right shoulder >3 hours  Long term goal 3: Complete lifting/holding up to 10# right UE  (hold  without discomfort)  Long term goal 4: Complete self-care ofreaching behind back and household tasks of lifting without discomfort    Post Treatment Pain:  2-3/10    Time In: 1602    Time Out : 1642   Timed Code Treatment Minutes: 40 Minutes  Total Treatment Time: 40 Minutes    Aroldo G[place]rie [place]     Date: 2022

## 2022-02-15 ENCOUNTER — HOSPITAL ENCOUNTER (OUTPATIENT)
Dept: PHYSICAL THERAPY | Age: 37
Setting detail: THERAPIES SERIES
Discharge: HOME | End: 2022-02-15
Payer: COMMERCIAL

## 2022-02-15 PROCEDURE — 97110 THERAPEUTIC EXERCISES: CPT

## 2022-02-15 ASSESSMENT — PAIN SCALES - GENERAL: PAINLEVEL_OUTOF10: 0

## 2022-02-18 ENCOUNTER — HOSPITAL ENCOUNTER (OUTPATIENT)
Dept: PHYSICAL THERAPY | Age: 37
Setting detail: THERAPIES SERIES
End: 2022-02-18
Payer: COMMERCIAL

## 2022-02-22 ENCOUNTER — HOSPITAL ENCOUNTER (OUTPATIENT)
Dept: PHYSICAL THERAPY | Age: 37
Setting detail: THERAPIES SERIES
Discharge: HOME | End: 2022-02-22
Payer: COMMERCIAL

## 2022-02-22 PROCEDURE — 97110 THERAPEUTIC EXERCISES: CPT

## 2022-02-22 PROCEDURE — G0283 ELEC STIM OTHER THAN WOUND: HCPCS

## 2022-02-22 ASSESSMENT — PAIN SCALES - GENERAL: PAINLEVEL_OUTOF10: 7

## 2022-02-22 NOTE — PROGRESS NOTES
Phone: [facility]      Fax: [phone]                            Outpatient Physical Therapy                                                                            Daily Note    Date: 2022  Patient Name: Shanika Galeano        MRN: [id number]   ACCT#:  [de-identified]  : 1985  (29 y.o.)    Referring Practitioner: Dr. Dennis Soto    Referral Date : 22    Diagnosis: Right shoulder pain  Treatment Diagnosis: Right shoulder pain    Onset Date: 22  PT Insurance Information: Zofia Spring  Total # of Visits Approved: 20 Per Physician Order  Total # of Visits to Date: 4  No Show: 0  Canceled Appointment: 0  Plan of Care/Certification Expiration Date: 22    Pre-Treatment Pain:  7/10     Assessment  Assessment: Patient notes increased soreness this date and relates this to son injurying self and being on crutches and requiring increased assistance. Completed ex with good tolerance with exception of abduction to 90 deg. Based on increased soreness, initiated Estim to right shoulder and will monitor beneifts.   Reported decreased soreness post treatment  Chart Reviewed: Yes    Plan  Plan: Continue with current plan    Exercises/Modalities/Manual:  See DocFlow Sheet    Education: Benefits of Estim           Goals  (Total # of Visits to Date: 4)   Short Term Goals - Time Frame for Short term goals: 6 visits  Short term goal 1: Educate on right shoulder postural stretching and strengthening                Long Term Goals - Time Frame for Long term goals : 14 visits  Long term goal 1: AROM right shoulder without discomfort  Long term goal 2: Be able to sleep on right shoulder >3 hours  Long term goal 3: Complete lifting/holding up to 10# right UE  (hold  without discomfort)  Long term goal 4: Complete self-care ofreaching behind back and household tasks of lifting without discomfort       Post Treatment Pain:  4/10    Time In: 1010    Time Out : 55        Timed Code Treatment Minutes: 30 Minutes  Total Treatment Time: 39 Minutes    MIGUE OJEDA, PT     Date: 2/22/2022

## 2022-02-25 ENCOUNTER — HOSPITAL ENCOUNTER (OUTPATIENT)
Dept: PHYSICAL THERAPY | Age: 37
Setting detail: THERAPIES SERIES
Discharge: HOME | End: 2022-02-25
Payer: COMMERCIAL

## 2022-02-25 PROCEDURE — 97110 THERAPEUTIC EXERCISES: CPT

## 2022-02-25 PROCEDURE — G0283 ELEC STIM OTHER THAN WOUND: HCPCS

## 2022-02-25 ASSESSMENT — PAIN SCALES - GENERAL: PAINLEVEL_OUTOF10: 4

## 2022-02-25 NOTE — PROGRESS NOTES
Phone: [facility]      Fax: [phone]                            Outpatient Physical Therapy                                                                            Daily Note    Date: 2022  Patient Name: Job Phipps        MRN: [id number]   ACCT#:  [de-identified]  : 1985  (29 y.o.)    Referring Practitioner: Dr. Bi Acosta    Referral Date : 22    Diagnosis: Right shoulder pain  Treatment Diagnosis: Right shoulder pain    Onset Date: 22  PT Insurance Information: Leslie Pete  Total # of Visits Approved: 20 Per Physician Order  Total # of Visits to Date: 5  No Show: 0  Canceled Appointment: 0  Plan of Care/Certification Expiration Date: 22    Pre-Treatment Pain:  4/10     Assessment  Assessment: Patient continues to note intermittent exacerbations of right shoulder pain not related to position or activity. Painful with shoulder abd and empty can ex. Continue with Estim for pain reduction and circulatory benefits.    Will continue through next week and then contact MD if no appreciable or consistent improvement noted for additional diagnostic testing  Chart Reviewed: Yes    Plan  Plan: Continue with current plan    Exercises/Modalities/Manual:  See DocFlow Sheet    Education:           Goals  (Total # of Visits to Date: 5)   Short Term Goals - Time Frame for Short term goals: 6 visits  Short term goal 1: Educate on right shoulder postural stretching and strengthening                Long Term Goals - Time Frame for Long term goals : 14 visits  Long term goal 1: AROM right shoulder without discomfort  Long term goal 2: Be able to sleep on right shoulder >3 hours  Long term goal 3: Complete lifting/holding up to 10# right UE  (hold  without discomfort)  Long term goal 4: Complete self-care ofreaching behind back and household tasks of lifting without discomfort       Post Treatment Pain:  3/10    Time In: 0850    Time Out : 0935        Timed Code Treatment Minutes: 25 Minutes  Total Treatment Time: 39 Minutes    MIGUE OJEDA, PT     Date: 2/25/2022

## 2022-03-01 ENCOUNTER — HOSPITAL ENCOUNTER (OUTPATIENT)
Dept: PHYSICAL THERAPY | Age: 37
Setting detail: THERAPIES SERIES
Discharge: HOME | End: 2022-03-01
Payer: COMMERCIAL

## 2022-03-01 PROCEDURE — 97140 MANUAL THERAPY 1/> REGIONS: CPT

## 2022-03-01 PROCEDURE — 97110 THERAPEUTIC EXERCISES: CPT

## 2022-03-01 NOTE — PROGRESS NOTES
Phone: [facility]      Fax: [phone]                            Outpatient Physical Therapy                                                                            Daily Note    Date: 3/1/2022  Patient Name: Sami Orellana        MRN: [id number]   ACCT#:  [de-identified]  : 1985  (29 y.o.)    Referring Practitioner: Dr. Saira Abbott    Referral Date : 22    Diagnosis: Right shoulder pain  Treatment Diagnosis: Right shoulder pain    Onset Date: 22  PT Insurance Information: Jerome Mosquera  Total # of Visits Approved: 20 Per Physician Order  Total # of Visits to Date: 6  No Show: 0  Canceled Appointment: 0  Plan of Care/Certification Expiration Date: 22    Pre-Treatment Pain:  0/10     Assessment  Assessment: Patient denies shoulder pain this afternoon. Patient notes over the weekend his shoulder felt pretty good. Continued with ex as outlined. Patient still notes pain with AROM ABD, but after finishing that motion his pain goes away. Held VIA [facility] [place] today as patient didn't have pain post session.    Chart Reviewed: Yes    Plan  Plan: Continue with current plan    Exercises/Modalities/Manual:  See DocFlow Sheet    Education:     Goals  (Total # of Visits to Date: 6)   Short Term Goals - Time Frame for Short term goals: 6 visits  Short term goal 1: Educate on right shoulder postural stretching and strengthening - MET    Long Term Goals - Time Frame for Long term goals : 14 visits  Long term goal 1: AROM right shoulder without discomfort  Long term goal 2: Be able to sleep on right shoulder >3 hours  Long term goal 3: Complete lifting/holding up to 10# right UE  (hold  without discomfort)  Long term goal 4: Complete self-care ofreaching behind back and household tasks of lifting without discomfort    Post Treatment Pain:  0/10    Time In: 1650    Time Out : 1725   Timed Code Treatment Minutes: 35 Minutes  Total Treatment Time: 35 Minutes    [place], [place]     Date:

## 2022-03-04 ENCOUNTER — HOSPITAL ENCOUNTER (OUTPATIENT)
Dept: PHYSICAL THERAPY | Age: 37
Setting detail: THERAPIES SERIES
Discharge: HOME | End: 2022-03-04
Payer: COMMERCIAL

## 2022-03-04 PROCEDURE — 97110 THERAPEUTIC EXERCISES: CPT

## 2022-03-04 NOTE — PROGRESS NOTES
Phone: [address]      Fax: [phone]                            Outpatient Physical Therapy                                                                            Daily Note    Date: 3/4/2022  Patient Name: Letha Cornejo        MRN: [id number]   ACCT#:  [de-identified]  : 1985  (29 y.o.)    Referring Practitioner: Dr. Marcellus Castro    Referral Date : 22    Diagnosis: Right shoulder pain  Treatment Diagnosis: Right shoulder pain    Onset Date: 22  PT Insurance Information: Shannon  Total # of Visits Approved: 20 Per Physician Order  Plan of Care/Certification Expiration Date: 22    Pre-Treatment Pain:  01-/10     Assessment  Assessment: Patient continues to report minimal to no right shoulder pain for past week. Completed ex with good tolerance with exception of mild discomfort yet with abduction and ER.    Based on status, will decrease to 1x per week with discharge next week if symptoms remain low  Chart Reviewed: Yes    Plan  Plan: Continue with current plan    Exercises/Modalities/Manual:  See DocFlow Sheet    Education:           Goals  ( )   Short Term Goals - Time Frame for Short term goals: 6 visits  Short term goal 1: Educate on right shoulder postural stretching and strengthening - MET                Long Term Goals - Time Frame for Long term goals : 14 visits  Long term goal 1: AROM right shoulder without discomfort  Long term goal 2: Be able to sleep on right shoulder >3 hours  Long term goal 3: Complete lifting/holding up to 10# right UE  (hold  without discomfort)  Long term goal 4: Complete self-care ofreaching behind back and household tasks of lifting without discomfort       Post Treatment Pain:  0-1/10    Time In: 0800    Time Out : 0830        Timed Code Treatment Minutes: 30 Minutes  Total Treatment Time: 30 Minutes    MIGUE OJEDA PT     Date: 3/4/2022

## 2022-03-07 ENCOUNTER — APPOINTMENT (OUTPATIENT)
Dept: PHYSICAL THERAPY | Age: 37
End: 2022-03-07
Payer: COMMERCIAL

## 2022-03-09 ENCOUNTER — APPOINTMENT (OUTPATIENT)
Dept: PHYSICAL THERAPY | Age: 37
End: 2022-03-09
Payer: COMMERCIAL

## 2022-03-14 ENCOUNTER — HOSPITAL ENCOUNTER (OUTPATIENT)
Dept: PHYSICAL THERAPY | Age: 37
Setting detail: THERAPIES SERIES
Discharge: HOME | End: 2022-03-14
Payer: COMMERCIAL

## 2022-03-14 PROCEDURE — 97110 THERAPEUTIC EXERCISES: CPT

## 2022-03-14 NOTE — PROGRESS NOTES
Phone: [address]      Fax: [phone]                            Outpatient Physical Therapy                                                                            Daily Note    Date: 3/14/2022  Patient Name: Letha Leung        MRN: [id number]   ACCT#:  [de-identified]  : 1985  (29 y.o.)    Referring Practitioner: Dr. Ale Whittaker    Referral Date : 22    Diagnosis: Right shoulder pain  Treatment Diagnosis: Right shoulder pain    Onset Date: 22  PT Insurance Information: Mariana Goff  Total # of Visits Approved: 20 Per Physician Order  Total # of Visits to Date: 7  No Show: 0  Canceled Appointment: 0  Plan of Care/Certification Expiration Date: 22    Pre-Treatment Pain:  0/10     Assessment  Assessment: Patient notes over the weekend shot a basketball and after doing that it flared his R shoulder up to a 6/10 right away, but then decreased back to a 3/10 the rest of the day. This morning patient denies pain, but notes it was a little sore this morning while laying on his R side. Plan to D/C at this time and have patient continue with HEP (Access Code: Edelmira Comer). If pain flares up again will have patient follow up with PCP about seeing an ortho for possible cortisone injection.   Chart Reviewed: Yes    Plan  Plan: Discharge    Exercises/Modalities/Manual:  See DocFlow Sheet    Education:     Goals  (Total # of Visits to Date: 7)   Short Term Goals - Time Frame for Short term goals: 6 visits  Short term goal 1: Educate on right shoulder postural stretching and strengthening - MET    Long Term Goals - Time Frame for Long term goals : 14 visits  Long term goal 1: AROM right shoulder without discomfort - NOT MET  Long term goal 2: Be able to sleep on right shoulder >3 hours - NOT MET  Long term goal 3: Complete lifting/holding up to 10# right UE  (hold  without discomfort) - MET  Long term goal 4: Complete self-care ofreaching behind back and household tasks of lifting without discomfort - MET    Post Treatment Pain:  0/10    Time In: 0950    Time Out : 1030   Timed Code Treatment Minutes: 35 Minutes  Total Treatment Time: 36 Minutes    Dea Lara [place]     Date: 3/14/2022

## 2022-03-18 NOTE — DISCHARGE SUMMARY
Phone: [facility]             Fax: [phone]                            Outpatient Physical Therapy                                                                    Discharge Summary    Patient: Abby Chen  : 1985  ACCT #: [de-identified]   Referring Practitioner: Dr. Sandra Mak      Diagnosis: Right shoulder pain    Date Treatment Initiated: 22  Date of Last Treatment: 3/14/22  PT Visit Information  Onset Date: 22  PT Insurance Information: Fredrick Nguyen  Total # of Visits Approved: 20  Total # of Visits to Date: 7  Plan of Care/Certification Expiration Date: 22  No Show: 0  Canceled Appointment: 0    Treatment Received:   Patient Education/HEP, Therapeutic Exercise and Manual Therapy       Assessment: Patient has completed 7 treatment sessions consisting of right shoulder strengthening/postural strengthening. Overall, his pain has decreased to 1-2/10 with only mild discomfort with abduction and ER. AROM WFL. Strength generally 4+/5. Patient has been educated on a home program and discussion made on benefits of cortisone injection;  Patient will contact PCP regarding this if symptoms persist.   Based on status, will discharge and allow patient to continue on an independent basis. Reason for Discharge:   Optimal Function Achieved    Comments:   Thank you for this referral      Nhung Pennington, PT  Date: 3/14/2022

## 2022-04-11 ENCOUNTER — HOSPITAL ENCOUNTER (OUTPATIENT)
Age: 37
Discharge: HOME | End: 2022-04-11
Payer: COMMERCIAL

## 2022-04-11 DIAGNOSIS — E11.65 UNCONTROLLED TYPE 2 DIABETES MELLITUS WITH HYPERGLYCEMIA (HCC): ICD-10-CM

## 2022-04-11 LAB
ALBUMIN SERPL-MCNC: 4.3 G/DL (ref 3.5–5.2)
ALP BLD-CCNC: 83 U/L (ref 40–129)
ALT SERPL-CCNC: 75 U/L (ref 5–41)
ANION GAP SERPL CALCULATED.3IONS-SCNC: 11 MMOL/L (ref 9–17)
AST SERPL-CCNC: 52 U/L
BILIRUB SERPL-MCNC: 0.46 MG/DL (ref 0.3–1.2)
BUN BLDV-MCNC: 10 MG/DL (ref 6–20)
BUN/CREAT BLD: 17 (ref 9–20)
CALCIUM SERPL-MCNC: 9.3 MG/DL (ref 8.6–10.4)
CHLORIDE BLD-SCNC: 101 MMOL/L (ref 98–107)
CHOLESTEROL/HDL RATIO: 4.3
CHOLESTEROL: 182 MG/DL
CO2: 24 MMOL/L (ref 20–31)
CREAT SERPL-MCNC: 0.59 MG/DL (ref 0.7–1.2)
CREATININE URINE: 186.6 MG/DL (ref 39–259)
GFR AFRICAN AMERICAN: >60 ML/MIN
GFR NON-AFRICAN AMERICAN: >60 ML/MIN
GFR SERPL CREATININE-BSD FRML MDRD: ABNORMAL ML/MIN/{1.73_M2}
GLUCOSE BLD-MCNC: 235 MG/DL (ref 70–99)
HDLC SERPL-MCNC: 42 MG/DL
LDL CHOLESTEROL: 104 MG/DL (ref 0–130)
MICROALBUMIN/CREAT 24H UR: 15 MG/L
MICROALBUMIN/CREAT UR-RTO: 8 MCG/MG CREAT
POTASSIUM SERPL-SCNC: 4 MMOL/L (ref 3.7–5.3)
SODIUM BLD-SCNC: 136 MMOL/L (ref 135–144)
TOTAL PROTEIN: 7.4 G/DL (ref 6.4–8.3)
TRIGL SERPL-MCNC: 181 MG/DL

## 2022-04-11 PROCEDURE — 80053 COMPREHEN METABOLIC PANEL: CPT

## 2022-04-11 PROCEDURE — 80061 LIPID PANEL: CPT

## 2022-04-11 PROCEDURE — 82570 ASSAY OF URINE CREATININE: CPT

## 2022-04-11 PROCEDURE — 83036 HEMOGLOBIN GLYCOSYLATED A1C: CPT

## 2022-04-11 PROCEDURE — 36415 COLL VENOUS BLD VENIPUNCTURE: CPT

## 2022-04-11 PROCEDURE — 82043 UR ALBUMIN QUANTITATIVE: CPT

## 2022-04-12 ENCOUNTER — OFFICE VISIT (OUTPATIENT)
Dept: FAMILY MEDICINE CLINIC | Age: 37
End: 2022-04-12
Payer: COMMERCIAL

## 2022-04-12 VITALS
BODY MASS INDEX: 44.1 KG/M2 | SYSTOLIC BLOOD PRESSURE: 132 MMHG | DIASTOLIC BLOOD PRESSURE: 84 MMHG | HEIGHT: 71 IN | WEIGHT: 315 LBS | HEART RATE: 88 BPM

## 2022-04-12 DIAGNOSIS — E11.65 UNCONTROLLED TYPE 2 DIABETES MELLITUS WITH HYPERGLYCEMIA (HCC): Primary | ICD-10-CM

## 2022-04-12 DIAGNOSIS — L40.9 PSORIASIS: ICD-10-CM

## 2022-04-12 DIAGNOSIS — E11.65 UNCONTROLLED TYPE 2 DIABETES MELLITUS WITH HYPERGLYCEMIA (HCC): ICD-10-CM

## 2022-04-12 DIAGNOSIS — E66.01 CLASS 3 SEVERE OBESITY DUE TO EXCESS CALORIES WITH SERIOUS COMORBIDITY AND BODY MASS INDEX (BMI) OF 45.0 TO 49.9 IN ADULT (HCC): ICD-10-CM

## 2022-04-12 DIAGNOSIS — K75.81 NASH (NONALCOHOLIC STEATOHEPATITIS): ICD-10-CM

## 2022-04-12 DIAGNOSIS — G89.29 CHRONIC RIGHT SHOULDER PAIN: Primary | ICD-10-CM

## 2022-04-12 DIAGNOSIS — M25.511 CHRONIC RIGHT SHOULDER PAIN: Primary | ICD-10-CM

## 2022-04-12 LAB
ESTIMATED AVERAGE GLUCOSE: 252 MG/DL
HBA1C MFR BLD: 10.4 % (ref 4–6)

## 2022-04-12 PROCEDURE — 1036F TOBACCO NON-USER: CPT | Performed by: INTERNAL MEDICINE

## 2022-04-12 PROCEDURE — 2022F DILAT RTA XM EVC RTNOPTHY: CPT | Performed by: INTERNAL MEDICINE

## 2022-04-12 PROCEDURE — 99214 OFFICE O/P EST MOD 30 MIN: CPT | Performed by: INTERNAL MEDICINE

## 2022-04-12 PROCEDURE — G8427 DOCREV CUR MEDS BY ELIG CLIN: HCPCS | Performed by: INTERNAL MEDICINE

## 2022-04-12 PROCEDURE — 3046F HEMOGLOBIN A1C LEVEL >9.0%: CPT | Performed by: INTERNAL MEDICINE

## 2022-04-12 PROCEDURE — G8417 CALC BMI ABV UP PARAM F/U: HCPCS | Performed by: INTERNAL MEDICINE

## 2022-04-12 RX ORDER — LANCETS 30 GAUGE
1 EACH MISCELLANEOUS DAILY
Qty: 100 EACH | Refills: 5 | Status: SHIPPED | OUTPATIENT
Start: 2022-04-12 | End: 2022-10-27 | Stop reason: SDUPTHER

## 2022-04-12 RX ORDER — GLUCOSAMINE HCL/CHONDROITIN SU 500-400 MG
CAPSULE ORAL
Qty: 100 STRIP | Refills: 5 | Status: SHIPPED | OUTPATIENT
Start: 2022-04-12 | End: 2022-10-27 | Stop reason: SDUPTHER

## 2022-04-12 RX ORDER — NEEDLES, DISPOSABLE 25GX5/8"
NEEDLE, DISPOSABLE MISCELLANEOUS
Qty: 100 EACH | Refills: 5 | Status: SHIPPED | OUTPATIENT
Start: 2022-04-12 | End: 2022-07-14 | Stop reason: SDUPTHER

## 2022-04-12 RX ORDER — BLOOD-GLUCOSE METER
1 KIT MISCELLANEOUS DAILY
Qty: 1 KIT | Refills: 0 | Status: SHIPPED | OUTPATIENT
Start: 2022-04-12

## 2022-04-12 RX ORDER — INSULIN GLARGINE 100 [IU]/ML
20 INJECTION, SOLUTION SUBCUTANEOUS DAILY
Qty: 5 PEN | Refills: 3 | Status: SHIPPED | OUTPATIENT
Start: 2022-04-12 | End: 2022-04-13 | Stop reason: CLARIF

## 2022-04-12 RX ORDER — SITAGLIPTIN 100 MG/1
TABLET, FILM COATED ORAL
COMMUNITY
Start: 2022-03-14 | End: 2022-07-14

## 2022-04-12 ASSESSMENT — ENCOUNTER SYMPTOMS
SORE THROAT: 0
CONSTIPATION: 0
ABDOMINAL PAIN: 0
NAUSEA: 0
BLOOD IN STOOL: 0
DIARRHEA: 0
RESPIRATORY NEGATIVE: 1

## 2022-04-12 NOTE — PROGRESS NOTES
Nazario Marcus (:  1985) is a 29 y.o. male,Established patient, here for evaluation of the following chief complaint(s):  Discuss Labs, Diabetes, Arm Pain (right arm pain persists. Has tried PT with little relief. ), and Psoriasis (follows with dermatology)         ASSESSMENT/PLAN:  1. Chronic right shoulder pain  -     External Referral To Orthopedic Surgery  2. WARD (nonalcoholic steatohepatitis)  -     Comprehensive Metabolic Panel; Future  3. Psoriasis  4. Class 3 severe obesity due to excess calories with serious comorbidity and body mass index (BMI) of 45.0 to 49.9 in adult ([facility] [address]  5. Uncontrolled type 2 diabetes mellitus with hyperglycemia (HCC)  -     Comprehensive Metabolic Panel; Future  -     Hemoglobin A1C; Future      Plan:  1. Chronic right shoulder pain  Since Physical therapy did not help long term, I will make a referral to Ortho for further evaluation and treatment. - External Referral To Orthopedic Surgery    2. WARD (nonalcoholic steatohepatitis)  Has seen Dr. Andriy Mclaughlin in GI in the past with the recommendation for wt loss. Take Vitamin E 400 units daily. Obtain labs approximately one week prior to the office appointment. Please fast for 12 hours prior to obtaining the labs. Water or black coffee (no cream or sugar) is allowed prior to the the labs. - Comprehensive Metabolic Panel; Future    3. Psoriasis  Following with Dermatology. 4. Class 3 severe obesity due to excess calories with serious comorbidity and body mass index (BMI) of 45.0 to 49.9 in adult [facility])  Follow a low carb / low calorie diet. Increase exercise. 5. Uncontrolled type 2 diabetes mellitus with hyperglycemia ([facility]  Waiting for Hgba1C to come back from lab. Repeat labs in 3 months. Continue to work on exercise / wt loss. - Comprehensive Metabolic Panel;  Future  - Hemoglobin A1C; Future    Nazario Marcus was instructed to follow up in the clinic in 3 months for check up or as needed with any medical issues. Subjective   SUBJECTIVE/OBJECTIVE:  Julia Guzman presents for a check up on his medical conditions DM II, Psoriasis. Julia Guzman denies new problems. Medications were reviewed with Julia Guzman, he is  tolerating the medication. Bowels are regular. There has not been rectal bleeding. Julia Guzman denies urinary complications, the urine stream is good. Julia Guzman denies chest pain and denies increasing shortness of breath. Labs from yesterday reviewed. Julia Guzman states he continues to have right arm pain. He had PT which helped some at the time but it returned. Worse with rapid movements of the arm (if he flinches). Hold his baby will cause an increase in pain. Past Medical History:  No date: WARD (nonalcoholic steatohepatitis)  No date: Psoriasis  No date: Psoriasis  No date: Sleep apnea    Past Surgical History:  No date: APPENDECTOMY  No date: TONSILLECTOMY    Social History    Socioeconomic History      Marital status:       Spouse name: Eloisa Goff      Number of children: 3      Years of education: 13      Highest education level: Associate degree: occupational, technical, or vocational program    Occupational History        Employer: [employer]    Tobacco Use      Smoking status: Never Smoker      Smokeless tobacco: Never Used    Vaping Use      Vaping Use: Never used    Substance and Sexual Activity      Alcohol use: Not on file      Drug use: Not on file      Sexual activity: Not on file    Other Topics      Concerns:        Not on file    Social History Narrative      Not on file    Social Determinants of Health  Financial Resource Strain: Low Risk       Difficulty of Paying Living Expenses: Not hard at all  Food Insecurity: No Food Insecurity      Worried About [address] 24h00 in the Last Year: Never true      Ran Out of Food in the Last Year: Never true  Transportation Needs:       Lack of Transportation (Medical): Not on file      Lack of Transportation (Non-Medical):  Not on file  Physical Activity:       Days of Exercise per Week: Not on file      Minutes of Exercise per Session: Not on file  Stress:       Feeling of Stress : Not on file  Social Connections:       Frequency of Communication with Friends and Family: Not on file      Frequency of Social Gatherings with Friends and Family: Not on file      Attends Anabaptism Services: Not on file      Active Member of [address] [place] [address] or Organizations: Not on file      Attends Club or Organization Meetings: Not on file      Marital Status: Not on file  Intimate Partner Violence:       Fear of Current or Ex-Partner: Not on file      Emotionally Abused: Not on file      Physically Abused: Not on file      Sexually Abused: Not on file  Housing Stability:       Unable to Pay for Housing in the Last Year: Not on file      Number of [place] in the Last Year: Not on file      Unstable Housing in the Last Year: Not on file    Review of patient's family history indicates:  Problem: No Known Problems      Relation: Mother          Age of Onset: (Not Specified)  Problem: No Known Problems      Relation: Father          Age of Onset: (Not Specified)      Current Outpatient Medications on File Prior to Visit:  JANUVIA 100 MG tablet, TAKE 1 TABLET BY MOUTH DAILY, Disp: , Rfl:   metFORMIN (GLUCOPHAGE XR) 500 MG extended release tablet, Take 1 tablet by mouth daily (with breakfast), Disp: 30 tablet, Rfl: 5  Risankizumab-rzaa (SKYRIZI, 150 MG DOSE, SC), Inject into the skin every 3 months , Disp: , Rfl:   hydrocortisone 2.5 % ointment, apply to affected area TWICE DAILY AS NEEDED TO PSORIASIS ON THE PENIS, Disp: , Rfl:   vitamin E 400 UNIT capsule, Take 1 capsule by mouth daily (Patient not taking: Reported on 9/16/2021), Disp: 30 capsule, Rfl: 3    No current facility-administered medications on file prior to visit.       No Known Allergies      Lab Results       Component                Value               Date                       NA 136                 04/11/2022                 K                        4.0                 04/11/2022                 CL                       101                 04/11/2022                 CO2                      24                  04/11/2022                 BUN                      10                  04/11/2022                 CREATININE               0.59 (L)            04/11/2022                 GLUCOSE                  235 (H)             04/11/2022                 CALCIUM                  9.3                 04/11/2022                 PROT                     7.4                 04/11/2022                 LABALBU                  4.3                 04/11/2022                 BILITOT                  0.46                04/11/2022                 ALKPHOS                  83                  04/11/2022                 AST                      52 (H)              04/11/2022                 ALT                      75 (H)              04/11/2022                 LABGLOM                  >60                 04/11/2022                 GFRAA                    >60                 04/11/2022                 GLOB                     NOT REPORTED        01/31/2014              Lab Results       Component                Value               Date                       LABA1C                   7.6                 09/03/2020            Lab Results       Component                Value               Date                       EAG                      180                 09/10/2019              Lab Results       Component                Value               Date                       CHOL                     182                 04/11/2022                 CHOL                     177                 09/10/2019                 CHOL                     183                 04/17/2019            Lab Results       Component                Value               Date                       TRIG                     181 (H) 04/11/2022                 TRIG                     180 (H)             09/10/2019                 TRIG                     152                 04/17/2019            Lab Results       Component                Value               Date                       HDL                      42                  04/11/2022                 HDL                      44                  09/10/2019                 HDL                      46                  04/17/2019            Lab Results       Component                Value               Date                       LDLCHOLESTEROL           104                 04/11/2022                 LDLCHOLESTEROL           97                  09/10/2019                 LDLCALC                  107                 04/17/2019            Lab Results       Component                Value               Date                       VLDL                                         09/10/2019             NOT REPORTED (H)  Lab Results       Component                Value               Date                       CHOLHDLRATIO             4.3                 04/11/2022                 CHOLHDLRATIO             4.0                 09/10/2019                              Diabetes  He presents for his follow-up diabetic visit. He has type 2 diabetes mellitus. His disease course has been fluctuating. There are no hypoglycemic associated symptoms. Pertinent negatives for diabetes include no chest pain. There are no hypoglycemic complications. Risk factors for coronary artery disease include diabetes mellitus, dyslipidemia, hypertension, male sex and obesity. Current diabetic treatment includes oral agent (dual therapy). He is compliant with treatment all of the time. He is following a diabetic diet. There is no change in his home blood glucose trend. An ACE inhibitor/angiotensin II receptor blocker is not being taken. Eye exam is current. Psoriasis  This is a chronic problem.  The current episode started more than 1 year ago. The lesions are diffuse. Treatments tried: [employer]. The treatment provided significant relief. Review of Systems   Constitutional: Negative. HENT: Negative for congestion, ear pain, postnasal drip, sneezing and sore throat. Eyes: Negative for visual disturbance. Respiratory: Negative. Cardiovascular: Negative for chest pain, palpitations and leg swelling. Gastrointestinal: Negative for abdominal pain, blood in stool, constipation, diarrhea and nausea. Genitourinary: Negative for difficulty urinating, dysuria, frequency and urgency. Musculoskeletal: Negative for arthralgias, joint swelling, myalgias, neck pain and neck stiffness. Skin: Negative. Neurological: Negative for syncope. Psychiatric/Behavioral: Negative. Objective   Physical Exam  Vitals and nursing note reviewed. Constitutional:       Appearance: He is well-developed. He is obese. HENT:      Head: Atraumatic. Eyes:      Conjunctiva/sclera: Conjunctivae normal.   Cardiovascular:      Rate and Rhythm: Normal rate and regular rhythm. Heart sounds: Normal heart sounds. Pulmonary:      Effort: Pulmonary effort is normal.      Breath sounds: Normal breath sounds. Abdominal:      Palpations: Abdomen is soft. Tenderness: There is no abdominal tenderness. Musculoskeletal:      Cervical back: Normal range of motion and neck supple. Lymphadenopathy:      Cervical: No cervical adenopathy. Skin:     Findings: No rash. Neurological:      Mental Status: He is alert. Psychiatric:         Behavior: Behavior normal.         Thought Content: Thought content normal.                  An electronic signature was used to authenticate this note.     --Von Solano MD

## 2022-06-28 NOTE — TELEPHONE ENCOUNTER
Health Maintenance   Topic Date Due    Varicella vaccine (1 of 2 - 2-dose childhood series) Never done    COVID-19 Vaccine (1) Never done    Pneumococcal 0-64 years Vaccine (1 - PCV) 06/26/1992    Diabetic foot exam  Never done    HIV screen  Never done    Hepatitis B vaccine (1 of 3 - Risk 3-dose series) Never done    DTaP/Tdap/Td vaccine (2 - Td or Tdap) 01/10/2022    A1C test (Diabetic or Prediabetic)  07/11/2022    Flu vaccine (Season Ended) 09/01/2022    Depression Screen  01/07/2023    Diabetic retinal exam  01/18/2023    Diabetic microalbuminuria test  04/11/2023    Lipids  04/11/2023    Hepatitis C screen  Completed    Hepatitis A vaccine  Aged Out    Hib vaccine  Aged Out    Meningococcal (ACWY) vaccine  Aged Out             (applicable per patient's age: Cancer Screenings, Depression Screening, Fall Risk Screening, Immunizations)    Hemoglobin A1C (%)   Date Value   04/11/2022 10.4 (H)   09/03/2020 7.6   05/22/2020 7.5     Microalb/Crt.  Ratio (mcg/mg creat)   Date Value   04/11/2022 8     LDL Cholesterol (mg/dL)   Date Value   04/11/2022 104     LDL Calculated (mg/dL)   Date Value   04/17/2019 107     AST (U/L)   Date Value   04/11/2022 52 (H)     ALT (U/L)   Date Value   04/11/2022 75 (H)     BUN (mg/dL)   Date Value   04/11/2022 10      (goal A1C is < 7)   (goal LDL is <100) need 30-50% reduction from baseline     BP Readings from Last 3 Encounters:   04/12/22 132/84   01/07/22 138/80   09/16/21 137/84    (goal /80)      All Future Testing planned in [facility]:  Lab Frequency Next Occurrence   Comprehensive Metabolic Panel Once 50/89/1636   Hemoglobin A1C Once 07/12/2022       Next Visit Date:  Future Appointments   Date Time Provider Symone Waters   7/12/2022 10:45 AM MD Rossy Danielle            Patient Active Problem List:     Psoriasis     WARD (nonalcoholic steatohepatitis)     Class 3 severe obesity due to excess calories with serious comorbidity and body mass index (BMI) of 45.0 to 49.9 in adult [facility])     Warts     Psoriasis vulgaris     Uncontrolled type 2 diabetes mellitus with hyperglycemia ([facility] [address]

## 2022-07-11 ENCOUNTER — HOSPITAL ENCOUNTER (OUTPATIENT)
Age: 37
Discharge: HOME | End: 2022-07-11
Payer: COMMERCIAL

## 2022-07-11 DIAGNOSIS — E11.65 UNCONTROLLED TYPE 2 DIABETES MELLITUS WITH HYPERGLYCEMIA (HCC): ICD-10-CM

## 2022-07-11 DIAGNOSIS — K75.81 NASH (NONALCOHOLIC STEATOHEPATITIS): ICD-10-CM

## 2022-07-11 LAB
ALBUMIN SERPL-MCNC: 4.4 G/DL (ref 3.5–5.2)
ALP BLD-CCNC: 74 U/L (ref 40–129)
ALT SERPL-CCNC: 49 U/L (ref 5–41)
ANION GAP SERPL CALCULATED.3IONS-SCNC: 10 MMOL/L (ref 9–17)
AST SERPL-CCNC: 28 U/L
BILIRUB SERPL-MCNC: 0.5 MG/DL (ref 0.3–1.2)
BUN BLDV-MCNC: 10 MG/DL (ref 6–20)
BUN/CREAT BLD: 16 (ref 9–20)
CALCIUM SERPL-MCNC: 9.1 MG/DL (ref 8.6–10.4)
CHLORIDE BLD-SCNC: 99 MMOL/L (ref 98–107)
CO2: 28 MMOL/L (ref 20–31)
CREAT SERPL-MCNC: 0.64 MG/DL (ref 0.7–1.2)
CREATININE URINE: 193.1 MG/DL (ref 39–259)
ESTIMATED AVERAGE GLUCOSE: 252 MG/DL
GFR AFRICAN AMERICAN: >60 ML/MIN
GFR NON-AFRICAN AMERICAN: >60 ML/MIN
GFR SERPL CREATININE-BSD FRML MDRD: ABNORMAL ML/MIN/{1.73_M2}
GLUCOSE BLD-MCNC: 242 MG/DL (ref 70–99)
HBA1C MFR BLD: 10.4 % (ref 4–6)
MICROALBUMIN/CREAT 24H UR: <12 MG/L
MICROALBUMIN/CREAT UR-RTO: NORMAL MCG/MG CREAT
POTASSIUM SERPL-SCNC: 3.8 MMOL/L (ref 3.7–5.3)
SODIUM BLD-SCNC: 137 MMOL/L (ref 135–144)
TOTAL PROTEIN: 7.3 G/DL (ref 6.4–8.3)

## 2022-07-11 PROCEDURE — 82043 UR ALBUMIN QUANTITATIVE: CPT

## 2022-07-11 PROCEDURE — 36415 COLL VENOUS BLD VENIPUNCTURE: CPT

## 2022-07-11 PROCEDURE — 82570 ASSAY OF URINE CREATININE: CPT

## 2022-07-11 PROCEDURE — 83036 HEMOGLOBIN GLYCOSYLATED A1C: CPT

## 2022-07-11 PROCEDURE — 80053 COMPREHEN METABOLIC PANEL: CPT

## 2022-07-12 ENCOUNTER — OFFICE VISIT (OUTPATIENT)
Dept: FAMILY MEDICINE CLINIC | Age: 37
End: 2022-07-12
Payer: COMMERCIAL

## 2022-07-12 VITALS
SYSTOLIC BLOOD PRESSURE: 146 MMHG | HEIGHT: 71 IN | WEIGHT: 315 LBS | DIASTOLIC BLOOD PRESSURE: 92 MMHG | BODY MASS INDEX: 44.1 KG/M2 | HEART RATE: 100 BPM

## 2022-07-12 DIAGNOSIS — E66.01 CLASS 3 SEVERE OBESITY DUE TO EXCESS CALORIES WITH SERIOUS COMORBIDITY AND BODY MASS INDEX (BMI) OF 45.0 TO 49.9 IN ADULT (HCC): ICD-10-CM

## 2022-07-12 DIAGNOSIS — E11.65 UNCONTROLLED TYPE 2 DIABETES MELLITUS WITH HYPERGLYCEMIA (HCC): Primary | ICD-10-CM

## 2022-07-12 DIAGNOSIS — R03.0 ELEVATED BP WITHOUT DIAGNOSIS OF HYPERTENSION: ICD-10-CM

## 2022-07-12 DIAGNOSIS — L40.0 PSORIASIS VULGARIS: ICD-10-CM

## 2022-07-12 DIAGNOSIS — K75.81 NASH (NONALCOHOLIC STEATOHEPATITIS): ICD-10-CM

## 2022-07-12 PROCEDURE — 2022F DILAT RTA XM EVC RTNOPTHY: CPT | Performed by: INTERNAL MEDICINE

## 2022-07-12 PROCEDURE — G8427 DOCREV CUR MEDS BY ELIG CLIN: HCPCS | Performed by: INTERNAL MEDICINE

## 2022-07-12 PROCEDURE — G8417 CALC BMI ABV UP PARAM F/U: HCPCS | Performed by: INTERNAL MEDICINE

## 2022-07-12 PROCEDURE — 3046F HEMOGLOBIN A1C LEVEL >9.0%: CPT | Performed by: INTERNAL MEDICINE

## 2022-07-12 PROCEDURE — 1036F TOBACCO NON-USER: CPT | Performed by: INTERNAL MEDICINE

## 2022-07-12 PROCEDURE — 99214 OFFICE O/P EST MOD 30 MIN: CPT | Performed by: INTERNAL MEDICINE

## 2022-07-12 ASSESSMENT — PATIENT HEALTH QUESTIONNAIRE - PHQ9
SUM OF ALL RESPONSES TO PHQ9 QUESTIONS 1 & 2: 0
SUM OF ALL RESPONSES TO PHQ QUESTIONS 1-9: 0
1. LITTLE INTEREST OR PLEASURE IN DOING THINGS: 0
SUM OF ALL RESPONSES TO PHQ QUESTIONS 1-9: 0
2. FEELING DOWN, DEPRESSED OR HOPELESS: 0
SUM OF ALL RESPONSES TO PHQ QUESTIONS 1-9: 0
SUM OF ALL RESPONSES TO PHQ QUESTIONS 1-9: 0

## 2022-07-12 ASSESSMENT — ENCOUNTER SYMPTOMS
RESPIRATORY NEGATIVE: 1
ABDOMINAL PAIN: 0
NAUSEA: 0
DIARRHEA: 0
SORE THROAT: 0
BLOOD IN STOOL: 0
CONSTIPATION: 0

## 2022-07-12 NOTE — PATIENT INSTRUCTIONS
Survey: You may be receiving a survey from [employer] regarding your visit today. You may get this in the mail, through your MyChart or in your email. Please complete the survey to enable us to provide the highest quality of care to you and your family. Please also, mention our names. If you cannot score us as very good (5 Stars) on any question, please feel free to call the office to discuss how we could have made your experience exceptional.      Thank You! MD Irving Lozano LPN Tammy, Honore Overman, GARRETTN RN    [facility] [address] [place] [address]      1. Uncontrolled type 2 diabetes mellitus with hyperglycemia (HCC)  Increase Lantus to 35 units daily. Call weekly with am blood glucose readings so insulin can be adjusted. Continue to work on wt loss / exercise. Obtain labs approximately one week prior to the office appointment. Please fast for 12 hours prior to obtaining the labs. Water or black coffee (no cream or sugar) is allowed prior to the the labs. .  - Hemoglobin A1C; Future  - Basic Metabolic Panel; Future    2. Class 3 severe obesity due to excess calories with serious comorbidity and body mass index (BMI) of 45.0 to 49.9 in adult [facility])  Recommend low carb / low calorie diet. 3. WARD (nonalcoholic steatohepatitis)  Work on wt loss / low fat diet. 4. Psoriasis vulgaris  Controlled on Skyrizi. 5. Elevated BP without diagnosis of hypertension  BP check in 2 weeks. Decrease sodium / caffeine. Steven Delgado was instructed to follow up in the clinic in 3 months for check up or as needed with any medical issues.

## 2022-07-12 NOTE — PROGRESS NOTES
Senait Garcia (:  1985) is a 40 y.o. male,Established patient, here for evaluation of the following chief complaint(s):  Diabetes (6 month check up. Discuss labs. ) and Psoriasis (follows with dermatology)         ASSESSMENT/PLAN:  1. Uncontrolled type 2 diabetes mellitus with hyperglycemia (HCC)  -     Hemoglobin A1C; Future  -     Basic Metabolic Panel; Future  2. Class 3 severe obesity due to excess calories with serious comorbidity and body mass index (BMI) of 45.0 to 49.9 in adult ([facility] [address]  3. WARD (nonalcoholic steatohepatitis)  4. Psoriasis vulgaris  5. Elevated BP without diagnosis of hypertension      Plan:  1. Uncontrolled type 2 diabetes mellitus with hyperglycemia (HCC)  Increase Lantus to 35 units daily. Call weekly with am blood glucose readings so insulin can be adjusted. Continue to work on wt loss / exercise. Obtain labs approximately one week prior to the office appointment. Please fast for 12 hours prior to obtaining the labs. Water or black coffee (no cream or sugar) is allowed prior to the the labs. .  - Hemoglobin A1C; Future  - Basic Metabolic Panel; Future    2. Class 3 severe obesity due to excess calories with serious comorbidity and body mass index (BMI) of 45.0 to 49.9 in adult [facility])  Recommend low carb / low calorie diet. 3. WARD (nonalcoholic steatohepatitis)  Work on wt loss / low fat diet. 4. Psoriasis vulgaris  Controlled on Skyrizi. 5. Elevated BP without diagnosis of hypertension  BP check in 2 weeks. Decrease sodium / caffeine. Senait Garcia was instructed to follow up in the clinic in 3 months for check up or as needed with any medical issues. Subjective   SUBJECTIVE/OBJECTIVE:  Becca Valverde presents for a check up on his medical conditions DM  II, Psoriasis. Becca Valverde denies new problems. Medications were reviewed with Becca Valverde, he is  tolerating the medication. Bowels are regular. There has not been rectal bleeding.   Becca Valverde denies urinary complications, the urine stream is good. Beena Gill denies chest pain and denies increasing shortness of breath. Past Medical History:  No date: WARD (nonalcoholic steatohepatitis)  No date: Psoriasis  No date: Psoriasis  No date: Sleep apnea    Past Surgical History:  No date: APPENDECTOMY  No date: TONSILLECTOMY    Social History    Socioeconomic History      Marital status:       Spouse name: Mikey Chua      Number of children: 3      Years of education: 13      Highest education level: Associate degree: occupational, technical, or vocational program    Occupational History        Employer: [employer]    Tobacco Use      Smoking status: Never Smoker      Smokeless tobacco: Never Used    Vaping Use      Vaping Use: Never used    Substance and Sexual Activity      Alcohol use: Not on file      Drug use: Not on file      Sexual activity: Not on file    Other Topics      Concerns:        Not on file    Social History Narrative      Not on file    Social Determinants of Health  Financial Resource Strain: Low Risk       Difficulty of Paying Living Expenses: Not hard at all  Food Insecurity: No Food Insecurity      Worried About [address] [employer] in the Last Year: Never true      Ran Out of Food in the Last Year: Never true  Transportation Needs:       Lack of Transportation (Medical): Not on file      Lack of Transportation (Non-Medical):  Not on file  Physical Activity:       Days of Exercise per Week: Not on file      Minutes of Exercise per Session: Not on file  Stress:       Feeling of Stress : Not on file  Social Connections:       Frequency of Communication with Friends and Family: Not on file      Frequency of Social Gatherings with Friends and Family: Not on file      Attends Yazidism Services: Not on file      Active Member of Clubs or Organizations: Not on file      Attends Club or Organization Meetings: Not on file      Marital Status: Not on file  Intimate Partner Violence:       Fear of Current or Ex-Partner: Not on file      Emotionally Abused: Not on file      Physically Abused: Not on file      Sexually Abused: Not on file  Housing Stability:       Unable to Pay for Housing in the Last Year: Not on file      Number of Places Lived in the Last Year: Not on file      Unstable Housing in the Last Year: Not on file    Review of patient's family history indicates:  Problem: No Known Problems      Relation: Mother          Age of Onset: (Not Specified)  Problem: No Known Problems      Relation: Father          Age of Onset: (Not Specified)      Current Outpatient Medications on File Prior to Visit:  insulin glargine (LANTUS;BASAGLAR) 100 UNIT/ML injection pen, Inject 20 Units into the skin nightly, Disp: 2 pen, Rfl: 5  JANUVIA 100 MG tablet, TAKE 1 TABLET BY MOUTH DAILY, Disp: , Rfl:   metFORMIN (GLUCOPHAGE XR) 500 MG extended release tablet, Take 1 tablet by mouth daily (with breakfast), Disp: 30 tablet, Rfl: 5  Risankizumab-rzaa (SKYRIZI, 150 MG DOSE, SC), Inject into the skin every 3 months , Disp: , Rfl:   vitamin E 400 UNIT capsule, Take 1 capsule by mouth daily, Disp: 30 capsule, Rfl: 3  NEEDLE, DISP, 30 G (BD DISP NEEDLES) 30G X 1/2\" MISC, Injection daily. , Disp: 100 each, Rfl: 5  glucose monitoring (FREESTYLE FREEDOM) kit, 1 kit by Does not apply route daily, Disp: 1 kit, Rfl: 0  blood glucose monitor strips, Test 1 times a day & as needed for symptoms of irregular blood glucose. Dispense sufficient amount for indicated testing frequency plus additional to accommodate PRN testing needs. , Disp: 100 strip, Rfl: 5  Lancets MISC, 1 each by Does not apply route daily, Disp: 100 each, Rfl: 5  hydrocortisone 2.5 % ointment, apply to affected area TWICE DAILY AS NEEDED TO PSORIASIS ON THE PENIS, Disp: , Rfl:     No current facility-administered medications on file prior to visit.       No Known Allergies      Lab Results       Component                Value               Date NA                       137                 07/11/2022                 K                        3.8                 07/11/2022                 CL                       99                  07/11/2022                 CO2                      28                  07/11/2022                 BUN                      10                  07/11/2022                 CREATININE               0.64 (L)            07/11/2022                 GLUCOSE                  242 (H)             07/11/2022                 CALCIUM                  9.1                 07/11/2022                 PROT                     7.3                 07/11/2022                 LABALBU                  4.4                 07/11/2022                 BILITOT                  0.50                07/11/2022                 ALKPHOS                  74                  07/11/2022                 AST                      28                  07/11/2022                 ALT                      49 (H)              07/11/2022                 LABGLOM                  >60                 07/11/2022                 GFRAA                    >60                 07/11/2022                 GLOB                     NOT REPORTED        01/31/2014              Lab Results       Component                Value               Date                       LABA1C                   10.4 (H)            07/11/2022            Lab Results       Component                Value               Date                       EAG                      252                 07/11/2022              Lab Results       Component                Value               Date                       CHOL                     182                 04/11/2022                 CHOL                     177                 09/10/2019                 CHOL                     183                 04/17/2019            Lab Results       Component                Value               Date                       TRIG provided no relief. Review of Systems   Constitutional: Negative. HENT: Negative for congestion, ear pain, postnasal drip, sneezing and sore throat. Eyes: Negative for visual disturbance. Respiratory: Negative. Cardiovascular: Negative for chest pain, palpitations and leg swelling. Gastrointestinal: Negative for abdominal pain, blood in stool, constipation, diarrhea and nausea. Genitourinary: Negative for difficulty urinating, dysuria, frequency and urgency. Musculoskeletal: Negative for arthralgias, joint swelling, myalgias, neck pain and neck stiffness. Skin: Negative. Neurological: Negative for syncope. Psychiatric/Behavioral: Negative. Objective   Physical Exam  Vitals and nursing note reviewed. Constitutional:       Appearance: He is well-developed. He is obese. HENT:      Head: Atraumatic. Eyes:      Conjunctiva/sclera: Conjunctivae normal.   Cardiovascular:      Rate and Rhythm: Normal rate and regular rhythm. Heart sounds: Normal heart sounds. Pulmonary:      Effort: Pulmonary effort is normal.      Breath sounds: Normal breath sounds. Abdominal:      Palpations: Abdomen is soft. Tenderness: There is no abdominal tenderness. Musculoskeletal:      Cervical back: Normal range of motion and neck supple. Lymphadenopathy:      Cervical: No cervical adenopathy. Skin:     Findings: No rash. Neurological:      Mental Status: He is alert. Psychiatric:         Behavior: Behavior normal.         Thought Content: Thought content normal.                  An electronic signature was used to authenticate this note.     --Keyur Castro MD

## 2022-07-14 DIAGNOSIS — E11.65 UNCONTROLLED TYPE 2 DIABETES MELLITUS WITH HYPERGLYCEMIA (HCC): ICD-10-CM

## 2022-07-14 DIAGNOSIS — E11.65 TYPE 2 DIABETES MELLITUS WITH HYPERGLYCEMIA (HCC): ICD-10-CM

## 2022-07-14 RX ORDER — SITAGLIPTIN 100 MG/1
TABLET, FILM COATED ORAL
Qty: 30 TABLET | Refills: 5 | Status: SHIPPED | OUTPATIENT
Start: 2022-07-14

## 2022-07-14 RX ORDER — NEEDLES, DISPOSABLE 25GX5/8"
NEEDLE, DISPOSABLE MISCELLANEOUS
Qty: 100 EACH | Refills: 5 | Status: SHIPPED | OUTPATIENT
Start: 2022-07-14 | End: 2022-10-27 | Stop reason: SDUPTHER

## 2022-07-14 RX ORDER — METFORMIN HYDROCHLORIDE 500 MG/1
TABLET, EXTENDED RELEASE ORAL
Qty: 30 TABLET | Refills: 5 | Status: SHIPPED | OUTPATIENT
Start: 2022-07-14

## 2022-08-01 ENCOUNTER — NURSE ONLY (OUTPATIENT)
Dept: FAMILY MEDICINE CLINIC | Age: 37
End: 2022-08-01

## 2022-08-01 VITALS — SYSTOLIC BLOOD PRESSURE: 130 MMHG | DIASTOLIC BLOOD PRESSURE: 80 MMHG

## 2022-08-01 DIAGNOSIS — Z01.30 BP CHECK: Primary | ICD-10-CM

## 2022-08-08 ENCOUNTER — TELEPHONE (OUTPATIENT)
Dept: FAMILY MEDICINE CLINIC | Age: 37
End: 2022-08-08

## 2022-08-17 ENCOUNTER — PATIENT MESSAGE (OUTPATIENT)
Dept: FAMILY MEDICINE CLINIC | Age: 37
End: 2022-08-17

## 2022-08-17 NOTE — TELEPHONE ENCOUNTER
From: Danie Robins  To: Dr. Veloz Railin2022 10:27 AM EDT  Subject: Sugar update    My sugar for the last week has been an average of 178     Thanks Jordon

## 2022-09-06 NOTE — TELEPHONE ENCOUNTER
Called patient and advised him to stay on current dose until Dr Sang Lion returns. Pt states he has enough medication until Dr Sang Lion returns. Patient denies any symptoms.

## 2022-09-27 ENCOUNTER — TELEPHONE (OUTPATIENT)
Dept: FAMILY MEDICINE CLINIC | Age: 37
End: 2022-09-27

## 2022-09-27 NOTE — TELEPHONE ENCOUNTER
Pt called with BS readings:    Pt states all fasting am BS readings are around 200    Pt is taking 40 units BID

## 2022-10-27 DIAGNOSIS — E11.65 UNCONTROLLED TYPE 2 DIABETES MELLITUS WITH HYPERGLYCEMIA (HCC): ICD-10-CM

## 2022-10-27 RX ORDER — NEEDLES, DISPOSABLE 25GX5/8"
NEEDLE, DISPOSABLE MISCELLANEOUS
Qty: 100 EACH | Refills: 5 | Status: SHIPPED | OUTPATIENT
Start: 2022-10-27

## 2022-10-27 RX ORDER — LANCETS 30 GAUGE
1 EACH MISCELLANEOUS DAILY
Qty: 100 EACH | Refills: 5 | Status: SHIPPED | OUTPATIENT
Start: 2022-10-27

## 2022-10-27 RX ORDER — GLUCOSAMINE HCL/CHONDROITIN SU 500-400 MG
CAPSULE ORAL
Qty: 100 STRIP | Refills: 5 | Status: SHIPPED | OUTPATIENT
Start: 2022-10-27

## 2022-10-27 NOTE — TELEPHONE ENCOUNTER
Last OV: 7/12/2022  Last RX:    Next scheduled apt: 11/22/2022      Pt has not been checking BS. Will get new strips and call Monday with readings.

## 2022-11-15 RX ORDER — SEMAGLUTIDE 1.34 MG/ML
1 INJECTION, SOLUTION SUBCUTANEOUS WEEKLY
Qty: 4 ADJUSTABLE DOSE PRE-FILLED PEN SYRINGE | Refills: 5 | Status: SHIPPED | OUTPATIENT
Start: 2022-11-15

## 2022-11-22 ENCOUNTER — HOSPITAL ENCOUNTER (OUTPATIENT)
Age: 37
Discharge: HOME | End: 2022-11-22
Payer: COMMERCIAL

## 2022-11-22 ENCOUNTER — OFFICE VISIT (OUTPATIENT)
Dept: FAMILY MEDICINE CLINIC | Age: 37
End: 2022-11-22
Payer: COMMERCIAL

## 2022-11-22 VITALS
SYSTOLIC BLOOD PRESSURE: 142 MMHG | WEIGHT: 315 LBS | DIASTOLIC BLOOD PRESSURE: 92 MMHG | BODY MASS INDEX: 44.1 KG/M2 | HEIGHT: 71 IN | HEART RATE: 98 BPM

## 2022-11-22 DIAGNOSIS — E11.65 UNCONTROLLED TYPE 2 DIABETES MELLITUS WITH HYPERGLYCEMIA (HCC): ICD-10-CM

## 2022-11-22 DIAGNOSIS — Z99.89 OSA ON CPAP: ICD-10-CM

## 2022-11-22 DIAGNOSIS — G47.33 OSA ON CPAP: ICD-10-CM

## 2022-11-22 DIAGNOSIS — K75.81 NASH (NONALCOHOLIC STEATOHEPATITIS): ICD-10-CM

## 2022-11-22 DIAGNOSIS — E11.65 UNCONTROLLED TYPE 2 DIABETES MELLITUS WITH HYPERGLYCEMIA (HCC): Primary | ICD-10-CM

## 2022-11-22 DIAGNOSIS — F32.0 CURRENT MILD EPISODE OF MAJOR DEPRESSIVE DISORDER WITHOUT PRIOR EPISODE (HCC): ICD-10-CM

## 2022-11-22 DIAGNOSIS — I10 PRIMARY HYPERTENSION: ICD-10-CM

## 2022-11-22 DIAGNOSIS — E66.01 CLASS 3 SEVERE OBESITY DUE TO EXCESS CALORIES WITH SERIOUS COMORBIDITY AND BODY MASS INDEX (BMI) OF 45.0 TO 49.9 IN ADULT (HCC): ICD-10-CM

## 2022-11-22 DIAGNOSIS — L40.9 PSORIASIS: ICD-10-CM

## 2022-11-22 LAB
ANION GAP SERPL CALCULATED.3IONS-SCNC: 11 MMOL/L (ref 9–17)
BUN BLDV-MCNC: 10 MG/DL (ref 6–20)
BUN/CREAT BLD: 19 (ref 9–20)
CALCIUM SERPL-MCNC: 9.3 MG/DL (ref 8.6–10.4)
CHLORIDE BLD-SCNC: 100 MMOL/L (ref 98–107)
CO2: 25 MMOL/L (ref 20–31)
CREAT SERPL-MCNC: 0.52 MG/DL (ref 0.7–1.2)
ESTIMATED AVERAGE GLUCOSE: 255 MG/DL
GFR SERPL CREATININE-BSD FRML MDRD: >60 ML/MIN/1.73M2
GLUCOSE BLD-MCNC: 227 MG/DL (ref 70–99)
HBA1C MFR BLD: 10.5 % (ref 4–6)
POTASSIUM SERPL-SCNC: 3.9 MMOL/L (ref 3.7–5.3)
SODIUM BLD-SCNC: 136 MMOL/L (ref 135–144)

## 2022-11-22 PROCEDURE — 3074F SYST BP LT 130 MM HG: CPT | Performed by: INTERNAL MEDICINE

## 2022-11-22 PROCEDURE — G8427 DOCREV CUR MEDS BY ELIG CLIN: HCPCS | Performed by: INTERNAL MEDICINE

## 2022-11-22 PROCEDURE — 1036F TOBACCO NON-USER: CPT | Performed by: INTERNAL MEDICINE

## 2022-11-22 PROCEDURE — G8417 CALC BMI ABV UP PARAM F/U: HCPCS | Performed by: INTERNAL MEDICINE

## 2022-11-22 PROCEDURE — 36415 COLL VENOUS BLD VENIPUNCTURE: CPT

## 2022-11-22 PROCEDURE — 83036 HEMOGLOBIN GLYCOSYLATED A1C: CPT

## 2022-11-22 PROCEDURE — 80048 BASIC METABOLIC PNL TOTAL CA: CPT

## 2022-11-22 PROCEDURE — 3078F DIAST BP <80 MM HG: CPT | Performed by: INTERNAL MEDICINE

## 2022-11-22 PROCEDURE — G8484 FLU IMMUNIZE NO ADMIN: HCPCS | Performed by: INTERNAL MEDICINE

## 2022-11-22 PROCEDURE — 2022F DILAT RTA XM EVC RTNOPTHY: CPT | Performed by: INTERNAL MEDICINE

## 2022-11-22 PROCEDURE — 99214 OFFICE O/P EST MOD 30 MIN: CPT | Performed by: INTERNAL MEDICINE

## 2022-11-22 PROCEDURE — 3046F HEMOGLOBIN A1C LEVEL >9.0%: CPT | Performed by: INTERNAL MEDICINE

## 2022-11-22 RX ORDER — LISINOPRIL 5 MG/1
5 TABLET ORAL DAILY
Qty: 90 TABLET | Refills: 1 | Status: SHIPPED | OUTPATIENT
Start: 2022-11-22

## 2022-11-22 ASSESSMENT — PATIENT HEALTH QUESTIONNAIRE - PHQ9
SUM OF ALL RESPONSES TO PHQ QUESTIONS 1-9: 2
1. LITTLE INTEREST OR PLEASURE IN DOING THINGS: 1
SUM OF ALL RESPONSES TO PHQ QUESTIONS 1-9: 2
2. FEELING DOWN, DEPRESSED OR HOPELESS: 1
SUM OF ALL RESPONSES TO PHQ9 QUESTIONS 1 & 2: 2
SUM OF ALL RESPONSES TO PHQ QUESTIONS 1-9: 2
SUM OF ALL RESPONSES TO PHQ QUESTIONS 1-9: 2

## 2022-11-22 NOTE — PROGRESS NOTES
Hong Young (:  1985) is a 40 y.o. male,Established patient, here for evaluation of the following chief complaint(s):  Diabetes (3 month check up. Discuss labs. BS running 220-280. Past 2 weeks, increased lantus from 50 BID to 60 bid, and added on ozempic. ), Blood Pressure Check, Sleep Apnea (Cpap machine recalled.), and Mental Health Problem (Spouse recommends discussing depression. )         ASSESSMENT/PLAN:  1. Uncontrolled type 2 diabetes mellitus with hyperglycemia (HCC)  -     Comprehensive Metabolic Panel; Future  -     Hemoglobin A1C; Future  -     Lipid Panel; Future  -     Microalbumin / Creatinine Urine Ratio; Future  2. Psoriasis  3. WARD (nonalcoholic steatohepatitis)  -     Comprehensive Metabolic Panel; Future  4. Class 3 severe obesity due to excess calories with serious comorbidity and body mass index (BMI) of 45.0 to 49.9 in [facility])  -     Lipid Panel; Future  5. ROSA on CPAP  6. Primary hypertension  -     lisinopril (PRINIVIL;ZESTRIL) 5 MG tablet; Take 1 tablet by mouth daily, Disp-90 tablet, R-1Normal  7. Current mild episode of major depressive disorder without prior episode (HCC)  -     sertraline (ZOLOFT) 50 MG tablet; Take 1 tablet by mouth daily, Disp-30 tablet, R-5Normal      Plan:  1. Uncontrolled type 2 diabetes mellitus with hyperglycemia (HCC)  Increase Lantus to 65 units two times a day. Just started on Ozempic. Call in 1 month for labs. - Comprehensive Metabolic Panel; Future  - Hemoglobin A1C; Future  - Lipid Panel; Future  - Microalbumin / Creatinine Urine Ratio; Future    2. Psoriasis  Doing better on Skyrizi. 3. WARD (nonalcoholic steatohepatitis)  Continue to work on wt loss / exercise. Low carb diet. - Comprehensive Metabolic Panel; Future    4. Class 3 severe obesity due to excess calories with serious comorbidity and body mass index (BMI) of 45.0 to 49.9 in [facility])  Discussed increase in exercise. - Lipid Panel; Future    5.  ROSA on CPAP  Continue on CPAP. 6. Primary hypertension  Add Lisinopril 5 mg daily. BP check in 2 weeks. - lisinopril (PRINIVIL;ZESTRIL) 5 MG tablet; Take 1 tablet by mouth daily  Dispense: 90 tablet; Refill: 1    7. Current mild episode of major depressive disorder without prior episode ([facility] [address]  Start on Zoloft 50 mg daily. Return in 1 month for checkup. Subjective   SUBJECTIVE/OBJECTIVE:  Ashley Dumont presents for a check up on his medical conditions DM II, Obesity, ROSA. Ashley Dumont denies new problems. Medications were reviewed with Ashley Dumont, he is  tolerating the medication. Bowels are regular. There has not been rectal bleeding. Ashley Dumont denies urinary complications, the urine stream is good. Ashley Dumont denies chest pain and denies increasing shortness of breath. Wife feels he is depressed. Ashley Dumont states on occasion he feels depressed and doesn't feel like doing any thing. He stays functional, \"I do what I need to do\". \"I get agitated on occasion\". Ashley Dumont does not feel he is depressed.       Past Medical History:  No date: WARD (nonalcoholic steatohepatitis)  No date: Psoriasis  No date: Psoriasis  No date: Sleep apnea    Past Surgical History:  No date: APPENDECTOMY  No date: TONSILLECTOMY    Social History    Socioeconomic History      Marital status:       Spouse name: Eliana Ngo      Number of children: 3      Years of education: 13      Highest education level: Associate degree: occupational, technical, or vocational program    Occupational History        Employer: [employer]    Tobacco Use      Smoking status: Never      Smokeless tobacco: Never    Vaping Use      Vaping Use: Never used    Substance and Sexual Activity      Alcohol use: Not on file      Drug use: Not on file      Sexual activity: Not on file    Other Topics      Concerns:        Not on file    Social History Narrative      Not on file    Social Determinants of Health  Financial Resource Strain: Low Risk       Difficulty of Paying Living Expenses: Not hard at all  Food Insecurity: No Food Insecurity      Worried About Running Out of Food in the Last Year: Never true      Ran Out of Food in the Last Year: Never true  Transportation Needs: Not on file  Physical Activity: Not on file  Stress: Not on file  Social Connections: Not on file  Intimate Partner Violence: Not on file  Housing Stability: Not on file    Review of patient's family history indicates:  Problem: No Known Problems      Relation: Mother          Age of Onset: (Not Specified)  Problem: No Known Problems      Relation: Father          Age of Onset: (Not Specified)      Current Outpatient Medications on File Prior to Visit:  Semaglutide, 1 MG/DOSE, (OZEMPIC, 1 MG/DOSE,) 2 MG/1.5ML SOPN, Inject 1 mg into the skin once a week, Disp: 4 Adjustable Dose Pre-filled Pen Syringe, Rfl: 5  insulin glargine (LANTUS;BASAGLAR) 100 UNIT/ML injection pen, Inject 50 Units into the skin 2 times daily (Patient taking differently: Inject 60 Units into the skin 2 times daily), Disp: 10 Adjustable Dose Pre-filled Pen Syringe, Rfl: 5  metFORMIN (GLUCOPHAGE-XR) 500 MG extended release tablet, TAKE 1 TABLET BY MOUTH DAILY with breakfast, Disp: 30 tablet, Rfl: 5  Risankizumab-rzaa (SKYRIZI, 150 MG DOSE, SC), Inject into the skin every 3 months , Disp: , Rfl:   vitamin E 400 UNIT capsule, Take 1 capsule by mouth daily, Disp: 30 capsule, Rfl: 3  blood glucose monitor strips, Please dispense INS approved test strips. Pt tests BS 1 times daily. E11.9, Disp: 100 strip, Rfl: 5  Lancets MISC, 1 each by Does not apply route daily, Disp: 100 each, Rfl: 5  NEEDLE, DISP, 30 G (BD DISP NEEDLES) 30G X 1/2\" MISC, Injection daily. , Disp: 100 each, Rfl: 5  glucose monitoring (FREESTYLE FREEDOM) kit, 1 kit by Does not apply route daily, Disp: 1 kit, Rfl: 0  hydrocortisone 2.5 % ointment, apply to affected area TWICE DAILY AS NEEDED TO PSORIASIS ON THE PENIS, Disp: , Rfl:     No current facility-administered medications on file prior to visit.       No Known Allergies      Lab Results       Component                Value               Date                       NA                       136                 11/22/2022                 K                        3.9                 11/22/2022                 CL                       100                 11/22/2022                 CO2                      25                  11/22/2022                 BUN                      10                  11/22/2022                 CREATININE               0.52 (L)            11/22/2022                 GLUCOSE                  227 (H)             11/22/2022                 CALCIUM                  9.3                 11/22/2022                 PROT                     7.3                 07/11/2022                 LABALBU                  4.4                 07/11/2022                 BILITOT                  0.50                07/11/2022                 ALKPHOS                  74                  07/11/2022                 AST                      28                  07/11/2022                 ALT                      49 (H)              07/11/2022                 LABGLOM                  >60                 11/22/2022                 GFRAA                    >60                 07/11/2022                 GLOB                     NOT REPORTED        01/31/2014              Lab Results       Component                Value               Date                       LABA1C                   10.4 (H)            07/11/2022            Lab Results       Component                Value               Date                       EAG                      252                 07/11/2022              Lab Results       Component                Value               Date                       CHOL                     182                 04/11/2022                 CHOL                     177                 09/10/2019                 CHOL                     183 04/17/2019            Lab Results       Component                Value               Date                       TRIG                     181 (H)             04/11/2022                 TRIG                     180 (H)             09/10/2019                 TRIG                     152                 04/17/2019            Lab Results       Component                Value               Date                       HDL                      42                  04/11/2022                 HDL                      44                  09/10/2019                 HDL                      46                  04/17/2019            Lab Results       Component                Value               Date                       LDLCHOLESTEROL           104                 04/11/2022                 LDLCHOLESTEROL           97                  09/10/2019                 LDLCALC                  107                 04/17/2019            Lab Results       Component                Value               Date                       VLDL                                         09/10/2019             NOT REPORTED (H)  Lab Results       Component                Value               Date                       CHOLHDLRATIO             4.3                 04/11/2022                 CHOLHDLRATIO             4.0                 09/10/2019                                Diabetes  He presents for his follow-up diabetic visit. He has type 2 diabetes mellitus. His disease course has been improving. There are no hypoglycemic associated symptoms. There are no hypoglycemic complications. Symptoms are improving. Risk factors for coronary artery disease include diabetes mellitus, dyslipidemia, hypertension, obesity and male sex. Current diabetic treatment includes insulin injections and oral agent (dual therapy). He is compliant with treatment all of the time. His weight is stable. He is following a diabetic diet.  There is no change in his home blood glucose trend. An ACE inhibitor/angiotensin II receptor blocker is not being taken. Review of Systems       Objective   Physical Exam             An electronic signature was used to authenticate this note.     --Ganesh Powell MD

## 2022-11-22 NOTE — PATIENT INSTRUCTIONS
Survey: You may be receiving a survey from [employer] regarding your visit today. You may get this in the mail, through your MyChart or in your email. Please complete the survey to enable us to provide the highest quality of care to you and your family. Please also, mention our names. If you cannot score us as very good (5 Stars) on any question, please feel free to call the office to discuss how we could have made your experience exceptional.      Thank You! MD Sadie Sahu [facility], [address] [place] [address] Jair, BSN RN    Gillian Peterson       . Uncontrolled type 2 diabetes mellitus with hyperglycemia (HCC)  Increase Lantus to 65 units two times a day. Just started on Ozempic. Call in 1 month for labs. - Comprehensive Metabolic Panel; Future  - Hemoglobin A1C; Future  - Lipid Panel; Future  - Microalbumin / Creatinine Urine Ratio; Future    2. Psoriasis  Doing better on Skyrizi. 3. WARD (nonalcoholic steatohepatitis)  Continue to work on wt loss / exercise. Low carb diet. - Comprehensive Metabolic Panel; Future    4. Class 3 severe obesity due to excess calories with serious comorbidity and body mass index (BMI) of 45.0 to 49.9 in adult [facility])  Discussed increase in exercise. - Lipid Panel; Future    5. ROSA on CPAP  Continue on CPAP. 6. Primary hypertension  Add Lisinopril 5 mg daily. BP check in 2 weeks. - lisinopril (PRINIVIL;ZESTRIL) 5 MG tablet; Take 1 tablet by mouth daily  Dispense: 90 tablet; Refill: 1    7. Current mild episode of major depressive disorder without prior episode ([address]  Start on Zoloft 50 mg daily. Return in 1 month for checkup.

## 2022-12-15 ENCOUNTER — TELEPHONE (OUTPATIENT)
Dept: FAMILY MEDICINE CLINIC | Age: 37
End: 2022-12-15

## 2022-12-28 ENCOUNTER — OFFICE VISIT (OUTPATIENT)
Dept: PRIMARY CARE CLINIC | Age: 37
End: 2022-12-28
Payer: COMMERCIAL

## 2022-12-28 VITALS
BODY MASS INDEX: 44.1 KG/M2 | RESPIRATION RATE: 18 BRPM | OXYGEN SATURATION: 98 % | HEIGHT: 71 IN | TEMPERATURE: 98.2 F | WEIGHT: 315 LBS | HEART RATE: 94 BPM | SYSTOLIC BLOOD PRESSURE: 121 MMHG | DIASTOLIC BLOOD PRESSURE: 80 MMHG

## 2022-12-28 DIAGNOSIS — H66.001 NON-RECURRENT ACUTE SUPPURATIVE OTITIS MEDIA OF RIGHT EAR WITHOUT SPONTANEOUS RUPTURE OF TYMPANIC MEMBRANE: Primary | ICD-10-CM

## 2022-12-28 PROCEDURE — 1036F TOBACCO NON-USER: CPT | Performed by: NURSE PRACTITIONER

## 2022-12-28 PROCEDURE — G8417 CALC BMI ABV UP PARAM F/U: HCPCS | Performed by: NURSE PRACTITIONER

## 2022-12-28 PROCEDURE — 99213 OFFICE O/P EST LOW 20 MIN: CPT | Performed by: NURSE PRACTITIONER

## 2022-12-28 PROCEDURE — G8484 FLU IMMUNIZE NO ADMIN: HCPCS | Performed by: NURSE PRACTITIONER

## 2022-12-28 PROCEDURE — G8427 DOCREV CUR MEDS BY ELIG CLIN: HCPCS | Performed by: NURSE PRACTITIONER

## 2022-12-28 RX ORDER — AMOXICILLIN 875 MG/1
875 TABLET, COATED ORAL 2 TIMES DAILY
Qty: 20 TABLET | Refills: 0 | Status: SHIPPED | OUTPATIENT
Start: 2022-12-28 | End: 2023-01-07

## 2022-12-28 ASSESSMENT — ENCOUNTER SYMPTOMS
SHORTNESS OF BREATH: 0
NAUSEA: 0
DIARRHEA: 0
SORE THROAT: 0
VOMITING: 0
COUGH: 1
WHEEZING: 0
RHINORRHEA: 0

## 2022-12-28 NOTE — PATIENT INSTRUCTIONS
SURVEY:    You may be receiving a survey from [employer] regarding your visit today. Please complete the survey to enable us to provide the highest quality of care to you and your family. If you cannot score us a very good on any question, please call the office to discuss how we could of made your experience a very good one. Thank you for letting us take care of you today. We hope all your questions were addressed. If a question was overlooked or something else comes to mind after you return home, please contact a member of your Care Team listed below. Thank you,  Jeanie Shah MA      Your Care Team at [address] [facility]  Provider- DORETHA Giles-CNP  Provider- Gabriel Yen, APRN-CNP  [address] [place] [address]  Reception- [place], [place]      Walk-in contact numbers:       Phone: [phone]                 Fax: [phone]    [facility] Walk-in Hours:  Mon-Thurs: 9:00 am - 5:30 pm     Friday: 8:00 am - 12:00 pm           Sat-Sun: CLOSED       Practice meticulous handwashing and cover cough to prevent spread of infection  Encouraged to increase fluids and rest   Continue antibiotic as prescribed until all doses completed. Tylenol/Ibuprofen OTC PRN for pain, discomfort or fever as directed on package according to age/weight. Warm compresses to ear to relieve discomfort  Elevate head of bed or use pillow. Patient instructions given for otitis media and amoxicillin. To ER or call 911 if any difficulty breathing, shortness of breath, inability to swallow, hives, rash, facial/tongue swelling or temp greater than 103 degrees. Follow up with PCP as needed if symptoms worsen or do not improve.

## 2022-12-28 NOTE — PROGRESS NOTES
[facility]  [address] [place] [address]    [facility] [address]  Dept: [phone]  Dept Fax: [phone]    Yair Trammell is a 40 y.o. male [facility] for hismedical conditions/complaints as noted below. Yair Trammell is c/o of Otalgia (Started today-Right ear pain. Congestion. )      HPI:     Otalgia   There is pain in the right ear. This is a new problem. The current episode started today (Started today with right ear pain but has had cough and congestion for awhile that is improving. Navneet Patella ). The problem has been gradually worsening. There has been no fever. The pain is at a severity of 8/10. Associated symptoms include coughing. Pertinent negatives include no diarrhea, ear discharge, headaches, hearing loss, rash, rhinorrhea, sore throat or vomiting. Associated symptoms comments: Congestion and slight cough. . He has tried nothing for the symptoms. The treatment provided no relief. There is no history of a chronic ear infection, hearing loss or a tympanostomy tube. Once a year gets ear infection. Past Medical History:   Diagnosis Date    WARD (nonalcoholic steatohepatitis)     Psoriasis     Psoriasis     Sleep apnea         Current Outpatient Medications   Medication Sig Dispense Refill    amoxicillin (AMOXIL) 875 MG tablet Take 1 tablet by mouth 2 times daily for 10 days 20 tablet 0    insulin glargine (LANTUS;BASAGLAR) 100 UNIT/ML injection pen Inject 65 Units into the skin 2 times daily 15 Adjustable Dose Pre-filled Pen Syringe 5    lisinopril (PRINIVIL;ZESTRIL) 5 MG tablet Take 1 tablet by mouth daily 90 tablet 1    sertraline (ZOLOFT) 50 MG tablet Take 1 tablet by mouth daily 30 tablet 5    Semaglutide, 1 MG/DOSE, (OZEMPIC, 1 MG/DOSE,) 2 MG/1.5ML SOPN Inject 1 mg into the skin once a week 4 Adjustable Dose Pre-filled Pen Syringe 5    blood glucose monitor strips Please dispense INS approved test strips. Pt tests BS 1 times daily. E11.9 100 strip 5    Lancets MISC 1 each by Does not apply route daily 100 each 5    NEEDLE, DISP, 30 G (BD DISP NEEDLES) 30G X 1/2\" MISC Injection daily. 100 each 5    metFORMIN (GLUCOPHAGE-XR) 500 MG extended release tablet TAKE 1 TABLET BY MOUTH DAILY with breakfast 30 tablet 5    glucose monitoring (FREESTYLE FREEDOM) kit 1 kit by Does not apply route daily 1 kit 0    Risankizumab-rzaa (SKYRIZI, 150 MG DOSE, SC) Inject into the skin every 3 months       vitamin E 400 UNIT capsule Take 1 capsule by mouth daily 30 capsule 3    hydrocortisone 2.5 % ointment apply to affected area TWICE DAILY AS NEEDED TO PSORIASIS ON THE PENIS (Patient not taking: Reported on 12/28/2022)       No current facility-administered medications for this visit. No Known Allergies    :     Review of Systems   Constitutional:  Negative for appetite change, chills, diaphoresis, fatigue and fever. HENT:  Positive for congestion and ear pain. Negative for ear discharge, hearing loss, rhinorrhea and sore throat. Respiratory:  Positive for cough. Negative for shortness of breath and wheezing. Gastrointestinal:  Negative for diarrhea, nausea and vomiting. Skin:  Negative for rash and wound. Neurological:  Negative for dizziness, light-headedness and headaches.     :     Physical Exam  Vitals and nursing note reviewed. Constitutional:       General: He is not in acute distress. Appearance: Normal appearance. He is well-developed. He is not ill-appearing or diaphoretic. Comments: Well hydrated, nontoxic appearance. HENT:      Head: Normocephalic and atraumatic. Right Ear: Hearing, ear canal and external ear normal. No drainage. A middle ear effusion is present. No mastoid tenderness. Tympanic membrane is injected, erythematous and bulging. Left Ear: Hearing, tympanic membrane, ear canal and external ear normal. No drainage. No middle ear effusion. No mastoid tenderness. Tympanic membrane is not injected, erythematous or bulging. Nose: Mucosal edema and congestion present. No rhinorrhea. Right Sinus: No maxillary sinus tenderness or frontal sinus tenderness. Left Sinus: No maxillary sinus tenderness or frontal sinus tenderness. Mouth/Throat:      Lips: Pink. Mouth: Mucous membranes are moist.      Pharynx: Oropharynx is clear. Uvula midline. No pharyngeal swelling, oropharyngeal exudate, posterior oropharyngeal erythema or uvula swelling. Eyes:      Conjunctiva/sclera: Conjunctivae normal.      Pupils: Pupils are equal, round, and reactive to light. Cardiovascular:      Rate and Rhythm: Normal rate and regular rhythm. Heart sounds: Normal heart sounds, S1 normal and S2 normal. No murmur heard. No friction rub. No gallop. Pulmonary:      Effort: Pulmonary effort is normal. No accessory muscle usage or respiratory distress. Breath sounds: Normal breath sounds and air entry. No decreased breath sounds, wheezing, rhonchi or rales. Comments: No cough during exam.  Breath sounds clear B/L anterior and posterior lobes. Chest expansion symmetrical.  No audible wheezing or respiratory distress. No rales or rhonchi. Musculoskeletal:      Cervical back: Neck supple. Lymphadenopathy:      Cervical: Cervical adenopathy present. Right cervical: Superficial cervical adenopathy present. No posterior cervical adenopathy. Left cervical: Superficial cervical adenopathy present. No posterior cervical adenopathy. Skin:     General: Skin is warm and dry. Coloration: Skin is not pale. Findings: No erythema or rash. Neurological:      Mental Status: He is alert and oriented to person, place, and time. Psychiatric:         Behavior: Behavior normal. Behavior is cooperative.    /80 (Site: Left Upper Arm, Position: Sitting, Cuff Size: Large Adult)   Pulse 94   Temp 98.2 °F (36.8 °C) (Oral)   Resp 18   Ht 5' 11\" (1.803 m) Wt (!) 336 lb (152.4 kg)   SpO2 98%   BMI 46.86 kg/m²     :      Diagnosis Orders   1. Non-recurrent acute suppurative otitis media of right ear without spontaneous rupture of tympanic membrane  amoxicillin (AMOXIL) 875 MG tablet          :      Return if symptoms worsen or fail to improve, for Resume all previous medications as directed. Orders Placed This Encounter   Medications    amoxicillin (AMOXIL) 875 MG tablet     Sig: Take 1 tablet by mouth 2 times daily for 10 days     Dispense:  20 tablet     Refill:  0      Practice meticulous handwashing and cover cough to prevent spread of infection  Encouraged to increase fluids and rest   Continue antibiotic as prescribed until all doses completed. Tylenol/Ibuprofen OTC PRN for pain, discomfort or fever as directed on package according to age/weight. Warm compresses to ear to relieve discomfort  Elevate head of bed or use pillow. Patient instructions given for otitis media and amoxicillin. To ER or call 911 if any difficulty breathing, shortness of breath, inability to swallow, hives, rash, facial/tongue swelling or temp greater than 103 degrees. Follow up with PCP as needed if symptoms worsen or do not improve. Solmon Shirts received counseling on the following healthy behaviors: medication adherence, increased fluids and rest.  Patient given educational materials - see patient instructions. Discussed use, benefit, and side effects of prescribed medications. Treatment plan discussed at visit. Continue routine health care follow up. All patient questions answered. Pt voiced understanding.       Electronically signed by DORETHA Salas CNP on 12/28/2022 at 2:40 PM

## 2023-01-10 ENCOUNTER — OFFICE VISIT (OUTPATIENT)
Dept: FAMILY MEDICINE CLINIC | Age: 38
End: 2023-01-10
Payer: COMMERCIAL

## 2023-01-10 VITALS
WEIGHT: 315 LBS | SYSTOLIC BLOOD PRESSURE: 128 MMHG | BODY MASS INDEX: 42.66 KG/M2 | DIASTOLIC BLOOD PRESSURE: 82 MMHG | HEIGHT: 72 IN | HEART RATE: 98 BPM

## 2023-01-10 DIAGNOSIS — H65.01 NON-RECURRENT ACUTE SEROUS OTITIS MEDIA OF RIGHT EAR: ICD-10-CM

## 2023-01-10 DIAGNOSIS — G47.33 OSA ON CPAP: ICD-10-CM

## 2023-01-10 DIAGNOSIS — Z99.89 OSA ON CPAP: ICD-10-CM

## 2023-01-10 DIAGNOSIS — F32.4 MAJOR DEPRESSIVE DISORDER WITH SINGLE EPISODE, IN PARTIAL REMISSION (HCC): Primary | ICD-10-CM

## 2023-01-10 DIAGNOSIS — E11.65 UNCONTROLLED TYPE 2 DIABETES MELLITUS WITH HYPERGLYCEMIA (HCC): ICD-10-CM

## 2023-01-10 PROCEDURE — 1036F TOBACCO NON-USER: CPT | Performed by: INTERNAL MEDICINE

## 2023-01-10 PROCEDURE — 2022F DILAT RTA XM EVC RTNOPTHY: CPT | Performed by: INTERNAL MEDICINE

## 2023-01-10 PROCEDURE — G8484 FLU IMMUNIZE NO ADMIN: HCPCS | Performed by: INTERNAL MEDICINE

## 2023-01-10 PROCEDURE — 3046F HEMOGLOBIN A1C LEVEL >9.0%: CPT | Performed by: INTERNAL MEDICINE

## 2023-01-10 PROCEDURE — G8427 DOCREV CUR MEDS BY ELIG CLIN: HCPCS | Performed by: INTERNAL MEDICINE

## 2023-01-10 PROCEDURE — G8417 CALC BMI ABV UP PARAM F/U: HCPCS | Performed by: INTERNAL MEDICINE

## 2023-01-10 PROCEDURE — 99214 OFFICE O/P EST MOD 30 MIN: CPT | Performed by: INTERNAL MEDICINE

## 2023-01-10 RX ORDER — DULAGLUTIDE 1.5 MG/.5ML
1.5 INJECTION, SOLUTION SUBCUTANEOUS WEEKLY
Qty: 4 ADJUSTABLE DOSE PRE-FILLED PEN SYRINGE | Refills: 5 | Status: SHIPPED | OUTPATIENT
Start: 2023-01-10

## 2023-01-10 RX ORDER — AZITHROMYCIN 250 MG/1
TABLET, FILM COATED ORAL
Qty: 1 PACKET | Refills: 0 | Status: SHIPPED | OUTPATIENT
Start: 2023-01-10 | End: 2023-01-20

## 2023-01-10 RX ORDER — SERTRALINE HYDROCHLORIDE 100 MG/1
100 TABLET, FILM COATED ORAL DAILY
Qty: 30 TABLET | Refills: 5 | Status: SHIPPED | OUTPATIENT
Start: 2023-01-10

## 2023-01-10 SDOH — ECONOMIC STABILITY: FOOD INSECURITY: WITHIN THE PAST 12 MONTHS, YOU WORRIED THAT YOUR FOOD WOULD RUN OUT BEFORE YOU GOT MONEY TO BUY MORE.: NEVER TRUE

## 2023-01-10 SDOH — ECONOMIC STABILITY: FOOD INSECURITY: WITHIN THE PAST 12 MONTHS, THE FOOD YOU BOUGHT JUST DIDN'T LAST AND YOU DIDN'T HAVE MONEY TO GET MORE.: NEVER TRUE

## 2023-01-10 ASSESSMENT — ENCOUNTER SYMPTOMS
CONSTIPATION: 0
SORE THROAT: 0
ABDOMINAL PAIN: 0
BLOOD IN STOOL: 0
RESPIRATORY NEGATIVE: 1
DIARRHEA: 0
NAUSEA: 0

## 2023-01-10 ASSESSMENT — PATIENT HEALTH QUESTIONNAIRE - PHQ9
5. POOR APPETITE OR OVEREATING: 0
SUM OF ALL RESPONSES TO PHQ QUESTIONS 1-9: 2
SUM OF ALL RESPONSES TO PHQ9 QUESTIONS 1 & 2: 2
2. FEELING DOWN, DEPRESSED OR HOPELESS: 1
9. THOUGHTS THAT YOU WOULD BE BETTER OFF DEAD, OR OF HURTING YOURSELF: 0
3. TROUBLE FALLING OR STAYING ASLEEP: 0
SUM OF ALL RESPONSES TO PHQ QUESTIONS 1-9: 2
6. FEELING BAD ABOUT YOURSELF - OR THAT YOU ARE A FAILURE OR HAVE LET YOURSELF OR YOUR FAMILY DOWN: 0
8. MOVING OR SPEAKING SO SLOWLY THAT OTHER PEOPLE COULD HAVE NOTICED. OR THE OPPOSITE, BEING SO FIGETY OR RESTLESS THAT YOU HAVE BEEN MOVING AROUND A LOT MORE THAN USUAL: 0
7. TROUBLE CONCENTRATING ON THINGS, SUCH AS READING THE NEWSPAPER OR WATCHING TELEVISION: 0
4. FEELING TIRED OR HAVING LITTLE ENERGY: 0
1. LITTLE INTEREST OR PLEASURE IN DOING THINGS: 1

## 2023-01-10 ASSESSMENT — SOCIAL DETERMINANTS OF HEALTH (SDOH): HOW HARD IS IT FOR YOU TO PAY FOR THE VERY BASICS LIKE FOOD, HOUSING, MEDICAL CARE, AND HEATING?: SOMEWHAT HARD

## 2023-01-10 NOTE — PATIENT INSTRUCTIONS
Survey: You may be receiving a survey from [employer] regarding your visit today. You may get this in the mail, through your MyChart or in your email. Please complete the survey to enable us to provide the highest quality of care to you and your family. Please also, mention our names. If you cannot score us as very good (5 Stars) on any question, please feel free to call the office to discuss how we could have made your experience exceptional.      Thank You! MD Maryuri Lobo, [address] [place] [address], Danisha John, BSN RN    Mckayla Orn       Plan:  1. Uncontrolled type 2 diabetes mellitus with hyperglycemia ([facility]  Improving with the addition of GLP 1 (Ozempic). Unfortunately, there is a shortage of Ozempic. Will change to Trulicity 1.5 mg weekly. - dulaglutide (TRULICITY) 1.5 GV/3.8WK SC injection; Inject 0.5 mLs into the skin once a week  Dispense: 4 Adjustable Dose Pre-filled Pen Syringe; Refill: 5    2. Major depressive disorder with single episode, in partial remission ([facility]  Improved but not well controlled. Increase Zoloft to 100 mg by mouth daily. - sertraline (ZOLOFT) 100 MG tablet; Take 1 tablet by mouth daily  Dispense: 30 tablet; Refill: 5    3. Non-recurrent acute serous otitis media of right ear  Did not resolve on Amoxicillin. Use Zpak as directed. - azithromycin (ZITHROMAX Z-TIO) 250 MG tablet; Two tablets by mouth the first day  then one tablet daily for 4 days. Dispense: 1 packet; Refill: 0    Follow up on next scheduled appointment or as needed.

## 2023-01-10 NOTE — PROGRESS NOTES
Nicolasa Mann (:  1985) is a 40 y.o. male,Established patient, here for evaluation of the following chief complaint(s):  Mental Health Problem (1 month f/u.  started on zoloft. ), Hypertension ( started on lisinopril. ), and Diabetes ( increased lantus from 50 u BID to 65 u BID. FBS's running 130. Continues on ozempic. Concerns with supply shortage at [facility].)         ASSESSMENT/PLAN:  1. Major depressive disorder with single episode, in partial remission (HCC)  -     sertraline (ZOLOFT) 100 MG tablet; Take 1 tablet by mouth daily, Disp-30 tablet, R-5Normal  2. Uncontrolled type 2 diabetes mellitus with hyperglycemia ([facility])  -     dulaglutide (TRULICITY) 1.5 IK/2.2CP SC injection; Inject 0.5 mLs into the skin once a week, Disp-4 Adjustable Dose Pre-filled Pen Syringe, R-5Normal  3. Non-recurrent acute serous otitis media of right ear  -     azithromycin (ZITHROMAX Z-TIO) 250 MG tablet; Two tablets by mouth the first day  then one tablet daily for 4 days. , Disp-1 packet, R-0Normal  4. ROSA on CPAP  -     Sleep Study with PAP Titration; Future      Plan:  1. Uncontrolled type 2 diabetes mellitus with hyperglycemia ([address]  Improving with the addition of GLP 1 (Ozempic). Unfortunately, there is a shortage of Ozempic. Will change to Trulicity 1.5 mg weekly. - dulaglutide (TRULICITY) 1.5 XP/4.5YJ SC injection; Inject 0.5 mLs into the skin once a week  Dispense: 4 Adjustable Dose Pre-filled Pen Syringe; Refill: 5    2. Major depressive disorder with single episode, in partial remission ([address]  Improved but not well controlled. Increase Zoloft to 100 mg by mouth daily. - sertraline (ZOLOFT) 100 MG tablet; Take 1 tablet by mouth daily  Dispense: 30 tablet; Refill: 5    3. Non-recurrent acute serous otitis media of right ear  Did not resolve on Amoxicillin. Use Zpak as directed. - azithromycin (ZITHROMAX Z-TIO) 250 MG tablet;  Two tablets by mouth the first day  then one tablet daily for 4 days.  Dispense: 1 packet; Refill: 0    4. ROSA on CPAP  Machine was recalled and his company needs a new order for CPAP. Is not following with a specialist.  Last study was years ago. Set up with CPAP titration study since it has been years and then write for a new machine with updated levels. Follow up on next scheduled appointment or as needed. Subjective   SUBJECTIVE/OBJECTIVE:  Gunjan Thayer feels he is doing better since starting Zoloft. He feels he tolerated the medication well. Gunjan Thayer states he still gets down a little but not like it was. The short fuse is not as bad as it was. He was started on Lisinopril and BP has been doing well. Recently started on Ozempic and BS is doing much better (120's in the am). Review of Systems   Constitutional: Negative. HENT:  Negative for congestion, ear pain, postnasal drip, sneezing and sore throat. Eyes:  Negative for visual disturbance. Respiratory: Negative. Cardiovascular:  Negative for chest pain, palpitations and leg swelling. Gastrointestinal:  Negative for abdominal pain, blood in stool, constipation, diarrhea and nausea. Genitourinary:  Negative for difficulty urinating, dysuria, frequency and urgency. Musculoskeletal:  Negative for arthralgias, joint swelling, myalgias, neck pain and neck stiffness. Skin: Negative. Neurological:  Negative for syncope. Psychiatric/Behavioral: Negative. Objective   Physical Exam  Vitals and nursing note reviewed. Constitutional:       Appearance: He is well-developed. He is obese. HENT:      Head: Atraumatic. Left Ear: Tympanic membrane normal.      Ears:      Comments: Right TM with erythema / bulging. Eyes:      Conjunctiva/sclera: Conjunctivae normal.   Cardiovascular:      Rate and Rhythm: Normal rate and regular rhythm. Heart sounds: Normal heart sounds. Pulmonary:      Effort: Pulmonary effort is normal.      Breath sounds: Normal breath sounds. Comments: Wheeze that cleared with cough. Abdominal:      Palpations: Abdomen is soft. Tenderness: There is no abdominal tenderness. Musculoskeletal:      Cervical back: Normal range of motion and neck supple. Lymphadenopathy:      Cervical: No cervical adenopathy. Skin:     Findings: No rash. Neurological:      Mental Status: He is alert. Psychiatric:         Behavior: Behavior normal.         Thought Content: Thought content normal.                An electronic signature was used to authenticate this note.     --Corey Downey MD

## 2023-01-17 ENCOUNTER — TELEPHONE (OUTPATIENT)
Dept: FAMILY MEDICINE CLINIC | Age: 38
End: 2023-01-17

## 2023-01-17 DIAGNOSIS — G47.33 OSA (OBSTRUCTIVE SLEEP APNEA): Primary | ICD-10-CM

## 2023-01-17 DIAGNOSIS — E11.65 UNCONTROLLED TYPE 2 DIABETES MELLITUS WITH HYPERGLYCEMIA (HCC): ICD-10-CM

## 2023-01-17 RX ORDER — METFORMIN HYDROCHLORIDE 500 MG/1
TABLET, EXTENDED RELEASE ORAL
Qty: 30 TABLET | Refills: 5 | Status: SHIPPED | OUTPATIENT
Start: 2023-01-17

## 2023-01-17 NOTE — TELEPHONE ENCOUNTER
Sleep lab called and states the patient's INS denied sleep study. Patient uses Auto CPAP and needs order for new machine.     New CPAP pended

## 2023-02-27 ENCOUNTER — HOSPITAL ENCOUNTER (OUTPATIENT)
Age: 38
Discharge: HOME | End: 2023-02-27
Payer: COMMERCIAL

## 2023-02-27 DIAGNOSIS — G47.33 OSA ON CPAP: ICD-10-CM

## 2023-02-27 DIAGNOSIS — Z99.89 OSA ON CPAP: ICD-10-CM

## 2023-02-27 DIAGNOSIS — K75.81 NASH (NONALCOHOLIC STEATOHEPATITIS): ICD-10-CM

## 2023-02-27 DIAGNOSIS — E11.65 UNCONTROLLED TYPE 2 DIABETES MELLITUS WITH HYPERGLYCEMIA (HCC): ICD-10-CM

## 2023-02-27 DIAGNOSIS — E66.01 CLASS 3 SEVERE OBESITY DUE TO EXCESS CALORIES WITH SERIOUS COMORBIDITY AND BODY MASS INDEX (BMI) OF 45.0 TO 49.9 IN ADULT (HCC): ICD-10-CM

## 2023-02-27 LAB
ALBUMIN SERPL-MCNC: 4.4 G/DL (ref 3.5–5.2)
ALP SERPL-CCNC: 85 U/L (ref 40–129)
ALT SERPL-CCNC: 49 U/L (ref 5–41)
ANION GAP SERPL CALCULATED.3IONS-SCNC: 11 MMOL/L (ref 9–17)
AST SERPL-CCNC: 27 U/L
BILIRUB SERPL-MCNC: 0.4 MG/DL (ref 0.3–1.2)
BUN SERPL-MCNC: 9 MG/DL (ref 6–20)
BUN/CREAT BLD: 13 (ref 9–20)
CALCIUM SERPL-MCNC: 9.5 MG/DL (ref 8.6–10.4)
CHLORIDE SERPL-SCNC: 103 MMOL/L (ref 98–107)
CHOLEST SERPL-MCNC: 204 MG/DL
CHOLESTEROL/HDL RATIO: 4.9
CO2 SERPL-SCNC: 23 MMOL/L (ref 20–31)
CREAT SERPL-MCNC: 0.67 MG/DL (ref 0.7–1.2)
CREATININE URINE: 355.1 MG/DL (ref 39–259)
EST. AVERAGE GLUCOSE BLD GHB EST-MCNC: 180 MG/DL
GFR SERPL CREATININE-BSD FRML MDRD: >60 ML/MIN/1.73M2
GLUCOSE SERPL-MCNC: 171 MG/DL (ref 70–99)
HBA1C MFR BLD: 7.9 % (ref 4–6)
HDLC SERPL-MCNC: 42 MG/DL
LDLC SERPL CALC-MCNC: 127 MG/DL (ref 0–130)
MICROALBUMIN/CREAT 24H UR: 14 MG/L
MICROALBUMIN/CREAT UR-RTO: 4 MCG/MG CREAT
PATIENT FASTING?: YES
POTASSIUM SERPL-SCNC: 3.9 MMOL/L (ref 3.7–5.3)
PROT SERPL-MCNC: 7.7 G/DL (ref 6.4–8.3)
SODIUM SERPL-SCNC: 137 MMOL/L (ref 135–144)
TRIGL SERPL-MCNC: 173 MG/DL

## 2023-02-27 PROCEDURE — 80061 LIPID PANEL: CPT

## 2023-02-27 PROCEDURE — 83036 HEMOGLOBIN GLYCOSYLATED A1C: CPT

## 2023-02-27 PROCEDURE — 82570 ASSAY OF URINE CREATININE: CPT

## 2023-02-27 PROCEDURE — 36415 COLL VENOUS BLD VENIPUNCTURE: CPT

## 2023-02-27 PROCEDURE — 82043 UR ALBUMIN QUANTITATIVE: CPT

## 2023-02-27 PROCEDURE — 80053 COMPREHEN METABOLIC PANEL: CPT

## 2023-02-28 ENCOUNTER — OFFICE VISIT (OUTPATIENT)
Dept: FAMILY MEDICINE CLINIC | Age: 38
End: 2023-02-28
Payer: COMMERCIAL

## 2023-02-28 VITALS
WEIGHT: 315 LBS | HEART RATE: 92 BPM | BODY MASS INDEX: 42.66 KG/M2 | SYSTOLIC BLOOD PRESSURE: 122 MMHG | DIASTOLIC BLOOD PRESSURE: 84 MMHG | HEIGHT: 72 IN

## 2023-02-28 DIAGNOSIS — G47.33 OSA ON CPAP: ICD-10-CM

## 2023-02-28 DIAGNOSIS — E66.01 CLASS 3 SEVERE OBESITY DUE TO EXCESS CALORIES WITH SERIOUS COMORBIDITY AND BODY MASS INDEX (BMI) OF 45.0 TO 49.9 IN ADULT (HCC): ICD-10-CM

## 2023-02-28 DIAGNOSIS — E78.2 MIXED HYPERCHOLESTEROLEMIA AND HYPERTRIGLYCERIDEMIA: ICD-10-CM

## 2023-02-28 DIAGNOSIS — Z99.89 OSA ON CPAP: ICD-10-CM

## 2023-02-28 DIAGNOSIS — L40.0 PSORIASIS VULGARIS: ICD-10-CM

## 2023-02-28 DIAGNOSIS — H69.83 DYSFUNCTION OF BOTH EUSTACHIAN TUBES: ICD-10-CM

## 2023-02-28 DIAGNOSIS — E11.65 UNCONTROLLED TYPE 2 DIABETES MELLITUS WITH HYPERGLYCEMIA (HCC): Primary | ICD-10-CM

## 2023-02-28 PROCEDURE — 3051F HG A1C>EQUAL 7.0%<8.0%: CPT | Performed by: INTERNAL MEDICINE

## 2023-02-28 PROCEDURE — G8427 DOCREV CUR MEDS BY ELIG CLIN: HCPCS | Performed by: INTERNAL MEDICINE

## 2023-02-28 PROCEDURE — G8484 FLU IMMUNIZE NO ADMIN: HCPCS | Performed by: INTERNAL MEDICINE

## 2023-02-28 PROCEDURE — 1036F TOBACCO NON-USER: CPT | Performed by: INTERNAL MEDICINE

## 2023-02-28 PROCEDURE — 2022F DILAT RTA XM EVC RTNOPTHY: CPT | Performed by: INTERNAL MEDICINE

## 2023-02-28 PROCEDURE — G8417 CALC BMI ABV UP PARAM F/U: HCPCS | Performed by: INTERNAL MEDICINE

## 2023-02-28 PROCEDURE — 99214 OFFICE O/P EST MOD 30 MIN: CPT | Performed by: INTERNAL MEDICINE

## 2023-02-28 RX ORDER — PRAVASTATIN SODIUM 20 MG
20 TABLET ORAL EVERY EVENING
Qty: 30 TABLET | Refills: 5 | Status: SHIPPED | OUTPATIENT
Start: 2023-02-28

## 2023-02-28 SDOH — ECONOMIC STABILITY: INCOME INSECURITY: HOW HARD IS IT FOR YOU TO PAY FOR THE VERY BASICS LIKE FOOD, HOUSING, MEDICAL CARE, AND HEATING?: NOT VERY HARD

## 2023-02-28 SDOH — ECONOMIC STABILITY: HOUSING INSECURITY
IN THE LAST 12 MONTHS, WAS THERE A TIME WHEN YOU DID NOT HAVE A STEADY PLACE TO SLEEP OR SLEPT IN A SHELTER (INCLUDING NOW)?: NO

## 2023-02-28 SDOH — ECONOMIC STABILITY: FOOD INSECURITY: WITHIN THE PAST 12 MONTHS, YOU WORRIED THAT YOUR FOOD WOULD RUN OUT BEFORE YOU GOT MONEY TO BUY MORE.: NEVER TRUE

## 2023-02-28 SDOH — ECONOMIC STABILITY: FOOD INSECURITY: WITHIN THE PAST 12 MONTHS, THE FOOD YOU BOUGHT JUST DIDN'T LAST AND YOU DIDN'T HAVE MONEY TO GET MORE.: NEVER TRUE

## 2023-02-28 ASSESSMENT — ENCOUNTER SYMPTOMS
ABDOMINAL PAIN: 0
SORE THROAT: 0
CONSTIPATION: 0
RESPIRATORY NEGATIVE: 1
NAUSEA: 0
BLOOD IN STOOL: 0
SHORTNESS OF BREATH: 0
DIARRHEA: 0

## 2023-02-28 NOTE — PATIENT INSTRUCTIONS
Survey: You may be receiving a survey from [employer] regarding your visit today. You may get this in the mail, through your MyChart or in your email. Please complete the survey to enable us to provide the highest quality of care to you and your family. Please also, mention our names. If you cannot score us as very good (5 Stars) on any question, please feel free to call the office to discuss how we could have made your experience exceptional.      Thank You! MD Pennie Abbott, [address] [place] [address], GARRETT RiversN SUZANNA Greene       Plan:  1. Uncontrolled type 2 diabetes mellitus with hyperglycemia (HCC)  Go back to taking Lantus 65 units but take daily instead of two times a day. Continue on Ozempic and Metformin XR. Obtain labs approximately one week prior to the office appointment. Please fast for 12 hours prior to obtaining the labs. Water or black coffee (no cream or sugar) is allowed prior to the the labs. - Comprehensive Metabolic Panel; Future  - Hemoglobin A1C; Future    2. ROSA on CPAP  Has been tolerating. 3. Class 3 severe obesity due to excess calories with serious comorbidity and body mass index (BMI) of 45.0 to 49.9 in [facility])  Continue to work on wt loss / exercise. Carb / calorie restricted. 4. Psoriasis vulgaris  Follows with dermatology. Doing well on skyrizi. 5. Mixed hypercholesterolemia and hypertriglyceridemia  Add Pravachol 20 mg nightly. Obtain labs approximately one week prior to the office appointment. Please fast for 12 hours prior to obtaining the labs. Water or black coffee (no cream or sugar) is allowed prior to the the labs. - pravastatin (PRAVACHOL) 20 MG tablet; Take 1 tablet by mouth every evening  Dispense: 30 tablet; Refill: 5  - Comprehensive Metabolic Panel; Future  - Lipid Panel; Future      6. Eustachian tube dysfunction  Use Flonase OTC:  2 puffs in each side of the nose daily until the pain resolves.   Use as needed. Milly Richter was instructed to follow up in the clinic in 3 months for check up or as needed with any medical issues.

## 2023-02-28 NOTE — PROGRESS NOTES
Abbie Tadeo (:  1985) is a 40 y.o. male,Established patient, here for evaluation of the following chief complaint(s):  Diabetes (3 month check up. Discuss labs. ), Hypertension, Mental Health Problem (Anxiety/depression, 1/10/23 increased zoloft 50 mg, to 100 mg daily, \"states sx's improving\"), Sleep Apnea (cpap), and Otalgia (C/o BL ear pain. )         ASSESSMENT/PLAN:  1. Uncontrolled type 2 diabetes mellitus with hyperglycemia (HCC)  -     Comprehensive Metabolic Panel; Future  -     Hemoglobin A1C; Future  2. ROSA on CPAP  3. Class 3 severe obesity due to excess calories with serious comorbidity and body mass index (BMI) of 45.0 to 49.9 in adult ([facility]  4. Psoriasis vulgaris  5. Mixed hypercholesterolemia and hypertriglyceridemia  -     pravastatin (PRAVACHOL) 20 MG tablet; Take 1 tablet by mouth every evening, Disp-30 tablet, R-5Normal  -     Comprehensive Metabolic Panel; Future  -     Lipid Panel; Future  6. Dysfunction of both eustachian tubes      Plan:  1. Uncontrolled type 2 diabetes mellitus with hyperglycemia (HCC)  Go back to taking Lantus 65 units but take daily instead of two times a day. Continue on Ozempic and Metformin XR. Obtain labs approximately one week prior to the office appointment. Please fast for 12 hours prior to obtaining the labs. Water or black coffee (no cream or sugar) is allowed prior to the the labs. - Comprehensive Metabolic Panel; Future  - Hemoglobin A1C; Future    2. ROSA on CPAP  Has been tolerating. 3. Class 3 severe obesity due to excess calories with serious comorbidity and body mass index (BMI) of 45.0 to 49.9 in adult [facility])  Continue to work on wt loss / exercise. Carb / calorie restricted. 4. Psoriasis vulgaris  Follows with dermatology. Doing well on skyrizi. 5. Mixed hypercholesterolemia and hypertriglyceridemia  Add Pravachol 20 mg nightly. Obtain labs approximately one week prior to the office appointment.   Please fast for 12 hours prior to obtaining the labs. Water or black coffee (no cream or sugar) is allowed prior to the the labs. - pravastatin (PRAVACHOL) 20 MG tablet; Take 1 tablet by mouth every evening  Dispense: 30 tablet; Refill: 5  - Comprehensive Metabolic Panel; Future  - Lipid Panel; Future      6. Eustachian tube dysfunction  Use Flonase OTC:  2 puffs in each side of the nose daily until the pain resolves. Use as needed. Tahir Jackson was instructed to follow up in the clinic in 3 months for check up or as needed with any medical issues. Subjective   SUBJECTIVE/OBJECTIVE:  Darrian Meyers presents for a check up on his medical conditions DM II, HTN, ROSA, depression / anxiety. Darrian Meyers denies new problems. Medications were reviewed with Darrian Meyers, he is  tolerating the medication. Bowels are regular. There has not been rectal bleeding. Darrian Meyers denies urinary complications, the urine stream is good. Darrian Meyers denies chest pain and denies increasing shortness of breath. Depression / anxiety has been controlled on Zoloft.       Past Medical History:  No date: WARD (nonalcoholic steatohepatitis)  No date: Psoriasis  No date: Psoriasis  No date: Sleep apnea    Past Surgical History:  No date: APPENDECTOMY  No date: TONSILLECTOMY    Social History    Socioeconomic History      Marital status:       Spouse name: Erlinda Bolanos      Number of children: 3      Years of education: 13      Highest education level: Associate degree: occupational, technical, or vocational program    Occupational History        Employer: [employer]    Tobacco Use      Smoking status: Never      Smokeless tobacco: Never    Vaping Use      Vaping Use: Never used    Substance and Sexual Activity      Alcohol use: Not on file      Drug use: Not on file      Sexual activity: Not on file    Other Topics      Concerns:        Not on file    Social History Narrative      Not on file    Social Determinants of Health  Financial Resource Strain: Low Risk       Difficulty of Paying Living Expenses: Not very hard  Food Insecurity: No Food Insecurity      Worried About Running Out of Food in the Last Year: Never true      Ran Out of Food in the Last Year: Never true  Transportation Needs: Unknown      Lack of Transportation (Medical): Not on file      Lack of Transportation (Non-Medical): No  Physical Activity: Not on file  Stress: Not on file  Social Connections: Not on file  Intimate Partner Violence: Not on file  Housing Stability: Unknown      Unable to Pay for Housing in the Last Year: Not on file      Number of Places Lived in the Last Year: Not on file      Unstable Housing in the Last Year: No    Review of patient's family history indicates:  Problem: No Known Problems      Relation: Mother          Age of Onset: (Not Specified)  Problem: No Known Problems      Relation: Father          Age of Onset: (Not Specified)      Current Outpatient Medications on File Prior to Visit:  metFORMIN (GLUCOPHAGE-XR) 500 MG extended release tablet, TAKE 1 TABLET BY MOUTH DAILY with breakfast, Disp: 30 tablet, Rfl: 5  sertraline (ZOLOFT) 100 MG tablet, Take 1 tablet by mouth daily, Disp: 30 tablet, Rfl: 5  insulin glargine (LANTUS;BASAGLAR) 100 UNIT/ML injection pen, Inject 65 Units into the skin 2 times daily, Disp: 15 Adjustable Dose Pre-filled Pen Syringe, Rfl: 5  lisinopril (PRINIVIL;ZESTRIL) 5 MG tablet, Take 1 tablet by mouth daily, Disp: 90 tablet, Rfl: 1  Semaglutide, 1 MG/DOSE, (OZEMPIC, 1 MG/DOSE,) 2 MG/1.5ML SOPN, Inject 1 mg into the skin once a week, Disp: 4 Adjustable Dose Pre-filled Pen Syringe, Rfl: 5  Risankizumab-rzaa (SKYRIZI, 150 MG DOSE, SC), Inject into the skin every 3 months , Disp: , Rfl:   vitamin E 400 UNIT capsule, Take 1 capsule by mouth daily, Disp: 30 capsule, Rfl: 3  blood glucose monitor strips, Please dispense INS approved test strips. Pt tests BS 1 times daily.  E11.9, Disp: 100 strip, Rfl: 5  Lancets MISC, 1 each by Does not apply route daily, Disp: 100 each, Rfl: 5  NEEDLE, DISP, 30 G (BD DISP NEEDLES) 30G X 1/2\" MISC, Injection daily. , Disp: 100 each, Rfl: 5  glucose monitoring (FREESTYLE FREEDOM) kit, 1 kit by Does not apply route daily, Disp: 1 kit, Rfl: 0  hydrocortisone 2.5 % ointment, apply to affected area TWICE DAILY AS NEEDED TO PSORIASIS ON THE PENIS (Patient not taking: Reported on 12/28/2022), Disp: , Rfl:     No current facility-administered medications on file prior to visit.       No Known Allergies      Lab Results       Component                Value               Date                       NA                       137                 02/27/2023                 K                        3.9                 02/27/2023                 CL                       103                 02/27/2023                 CO2                      23                  02/27/2023                 BUN                      9                   02/27/2023                 CREATININE               0.67 (L)            02/27/2023                 GLUCOSE                  171 (H)             02/27/2023                 CALCIUM                  9.5                 02/27/2023                 PROT                     7.7                 02/27/2023                 LABALBU                  4.4                 02/27/2023                 BILITOT                  0.4                 02/27/2023                 ALKPHOS                  85                  02/27/2023                 AST                      27                  02/27/2023                 ALT                      49 (H)              02/27/2023                 LABGLOM                  >60                 02/27/2023                 GFRAA                    >60                 07/11/2022                 GLOB                     NOT REPORTED        01/31/2014              Lab Results       Component                Value               Date                       LABA1C                   7.9 (H) 02/27/2023            Lab Results       Component                Value               Date                       EAG                      180                 02/27/2023              Lab Results       Component                Value               Date                       CHOL                     204 (H)             02/27/2023                 CHOL                     182                 04/11/2022                 CHOL                     177                 09/10/2019            Lab Results       Component                Value               Date                       TRIG                     173 (H)             02/27/2023                 TRIG                     181 (H)             04/11/2022                 TRIG                     180 (H)             09/10/2019            Lab Results       Component                Value               Date                       HDL                      42                  02/27/2023                 HDL                      42                  04/11/2022                 HDL                      44                  09/10/2019            Lab Results       Component                Value               Date                       LDLCHOLESTEROL           127                 02/27/2023                 LDLCHOLESTEROL           104                 04/11/2022                 LDLCHOLESTEROL           97                  09/10/2019                 LDLCALC                  107                 04/17/2019            Lab Results       Component                Value               Date                       VLDL                                         09/10/2019             NOT REPORTED (H)  Lab Results       Component                Value               Date                       CHOLHDLRATIO             4.9                 02/27/2023                 CHOLHDLRATIO             4.3                 04/11/2022                 CHOLHDLRATIO             4.0                 09/10/2019 Diabetes  He presents for his follow-up diabetic visit. He has type 2 diabetes mellitus. His disease course has been fluctuating. There are no hypoglycemic associated symptoms. Pertinent negatives for diabetes include no chest pain. There are no hypoglycemic complications. There are no diabetic complications. Current diabetic treatment includes insulin injections and oral agent (monotherapy). He is compliant with treatment most of the time. His weight is stable. He is following a diabetic diet. He participates in exercise daily. There is no change in his home blood glucose trend. An ACE inhibitor/angiotensin II receptor blocker is being taken. Eye exam is current. Hypertension  This is a chronic problem. The current episode started more than 1 year ago. The problem is unchanged. The problem is controlled. Pertinent negatives include no chest pain, neck pain, palpitations or shortness of breath. Risk factors for coronary artery disease include diabetes mellitus, dyslipidemia, male gender, family history and obesity. Past treatments include ACE inhibitors. The current treatment provides significant improvement. There is no history of angina. Otalgia   There is pain in both ears. This is a recurrent problem. The current episode started 1 to 4 weeks ago. The problem has been waxing and waning. There has been no fever. Pertinent negatives include no abdominal pain, diarrhea, neck pain or sore throat. He has tried nothing for the symptoms. Review of Systems   Constitutional: Negative. HENT:  Positive for ear pain. Negative for congestion, postnasal drip, sneezing and sore throat. Eyes:  Negative for visual disturbance. Respiratory: Negative. Negative for shortness of breath. Cardiovascular:  Negative for chest pain, palpitations and leg swelling. Gastrointestinal:  Negative for abdominal pain, blood in stool, constipation, diarrhea and nausea.    Genitourinary:  Negative for difficulty urinating, dysuria, frequency and urgency. Musculoskeletal:  Negative for arthralgias, joint swelling, myalgias, neck pain and neck stiffness. Skin: Negative. Neurological:  Negative for syncope. Psychiatric/Behavioral: Negative. Objective   Physical Exam  Vitals and nursing note reviewed. Constitutional:       Appearance: He is well-developed. He is obese. HENT:      Head: Atraumatic. Eyes:      Conjunctiva/sclera: Conjunctivae normal.   Cardiovascular:      Rate and Rhythm: Normal rate and regular rhythm. Heart sounds: Normal heart sounds. Pulmonary:      Effort: Pulmonary effort is normal.      Breath sounds: Normal breath sounds. Abdominal:      Palpations: Abdomen is soft. Tenderness: There is no abdominal tenderness. Musculoskeletal:      Cervical back: Normal range of motion and neck supple. Lymphadenopathy:      Cervical: No cervical adenopathy. Skin:     Findings: No rash. Neurological:      Mental Status: He is alert. Psychiatric:         Behavior: Behavior normal.         Thought Content: Thought content normal.                An electronic signature was used to authenticate this note.     --Karina Albarran MD

## 2023-05-17 DIAGNOSIS — E78.2 MIXED HYPERCHOLESTEROLEMIA AND HYPERTRIGLYCERIDEMIA: ICD-10-CM

## 2023-05-18 RX ORDER — PRAVASTATIN SODIUM 20 MG
20 TABLET ORAL EVERY EVENING
Qty: 30 TABLET | Refills: 5 | Status: SHIPPED | OUTPATIENT
Start: 2023-05-18

## 2023-05-18 RX ORDER — SEMAGLUTIDE 1.34 MG/ML
1 INJECTION, SOLUTION SUBCUTANEOUS WEEKLY
Qty: 4 ADJUSTABLE DOSE PRE-FILLED PEN SYRINGE | Refills: 5 | Status: SHIPPED | OUTPATIENT
Start: 2023-05-18

## 2023-05-18 NOTE — TELEPHONE ENCOUNTER
Health Maintenance   Topic Date Due    COVID-19 Vaccine (1) Never done    Varicella vaccine (1 of 2 - 2-dose childhood series) Never done    Pneumococcal 0-64 years Vaccine (1 - PCV) Never done    Diabetic foot exam  Never done    HIV screen  Never done    Hepatitis B vaccine (1 of 3 - Risk 3-dose series) Never done    DTaP/Tdap/Td vaccine (2 - Td or Tdap) 01/10/2022    Diabetic retinal exam  01/18/2023    Flu vaccine (Season Ended) 08/01/2023    Depression Monitoring  01/10/2024    A1C test (Diabetic or Prediabetic)  02/27/2024    Diabetic Alb to Cr ratio (uACR) test  02/27/2024    Lipids  02/27/2024    GFR test (Diabetes, CKD 3-4, OR last GFR 15-59)  02/27/2024    Hepatitis C screen  Completed    Hepatitis A vaccine  Aged Out    Hib vaccine  Aged Out    Meningococcal (ACWY) vaccine  Aged Out    Depression Screen  Discontinued             (applicable per patient's age: Cancer Screenings, Depression Screening, Fall Risk Screening, Immunizations)    Hemoglobin A1C (%)   Date Value   02/27/2023 7.9 (H)   11/22/2022 10.5 (H)   07/11/2022 10.4 (H)     Microalb/Crt.  Ratio (mcg/mg creat)   Date Value   02/27/2023 4     LDL Cholesterol (mg/dL)   Date Value   02/27/2023 127     LDL Calculated (mg/dL)   Date Value   04/17/2019 107     AST (U/L)   Date Value   02/27/2023 27     ALT (U/L)   Date Value   02/27/2023 49 (H)     BUN (mg/dL)   Date Value   02/27/2023 9      (goal A1C is < 7)   (goal LDL is <100) need 30-50% reduction from baseline     BP Readings from Last 3 Encounters:   02/28/23 122/84   01/10/23 128/82   12/28/22 121/80    (goal /80)      All Future Testing planned in [facility]:  Lab Frequency Next Occurrence   Comprehensive Metabolic Panel Once 92/50/7673   Hemoglobin A1C Once 05/28/2023   Lipid Panel Once 05/28/2023       Next Visit Date:  Future Appointments   Date Time Provider Symone Waters   6/15/2023  8:30 AM Nehal Bran MD Everardo Geronimo [address] [facility]            Patient Active Problem List:

## 2023-09-24 ENCOUNTER — APPOINTMENT (OUTPATIENT)
Dept: GENERAL RADIOLOGY | Age: 38
End: 2023-09-24
Payer: COMMERCIAL

## 2023-09-24 ENCOUNTER — HOSPITAL ENCOUNTER (EMERGENCY)
Age: 38
Discharge: HOME | End: 2023-09-24
Attending: EMERGENCY MEDICINE
Payer: COMMERCIAL

## 2023-09-24 VITALS
HEIGHT: 72 IN | RESPIRATION RATE: 18 BRPM | SYSTOLIC BLOOD PRESSURE: 137 MMHG | BODY MASS INDEX: 42.66 KG/M2 | TEMPERATURE: 98.5 F | DIASTOLIC BLOOD PRESSURE: 96 MMHG | HEART RATE: 95 BPM | OXYGEN SATURATION: 96 % | WEIGHT: 315 LBS

## 2023-09-24 DIAGNOSIS — S61.315A LACERATION OF LEFT RING FINGER WITHOUT FOREIGN BODY WITH DAMAGE TO NAIL, INITIAL ENCOUNTER: Primary | ICD-10-CM

## 2023-09-24 DIAGNOSIS — S62.665B OPEN NONDISPLACED FRACTURE OF DISTAL PHALANX OF LEFT RING FINGER, INITIAL ENCOUNTER: ICD-10-CM

## 2023-09-24 PROCEDURE — 6360000002 HC RX W HCPCS: Performed by: EMERGENCY MEDICINE

## 2023-09-24 PROCEDURE — 6370000000 HC RX 637 (ALT 250 FOR IP): Performed by: EMERGENCY MEDICINE

## 2023-09-24 PROCEDURE — 90471 IMMUNIZATION ADMIN: CPT | Performed by: EMERGENCY MEDICINE

## 2023-09-24 PROCEDURE — 2500000003 HC RX 250 WO HCPCS: Performed by: EMERGENCY MEDICINE

## 2023-09-24 PROCEDURE — 90715 TDAP VACCINE 7 YRS/> IM: CPT | Performed by: EMERGENCY MEDICINE

## 2023-09-24 PROCEDURE — 73130 X-RAY EXAM OF HAND: CPT

## 2023-09-24 PROCEDURE — 99284 EMERGENCY DEPT VISIT MOD MDM: CPT

## 2023-09-24 PROCEDURE — 96372 THER/PROPH/DIAG INJ SC/IM: CPT

## 2023-09-24 RX ORDER — KETOROLAC TROMETHAMINE 30 MG/ML
60 INJECTION, SOLUTION INTRAMUSCULAR; INTRAVENOUS ONCE
Status: COMPLETED | OUTPATIENT
Start: 2023-09-24 | End: 2023-09-24

## 2023-09-24 RX ORDER — AMOXICILLIN AND CLAVULANATE POTASSIUM 875; 125 MG/1; MG/1
1 TABLET, FILM COATED ORAL ONCE
Status: COMPLETED | OUTPATIENT
Start: 2023-09-24 | End: 2023-09-24

## 2023-09-24 RX ORDER — AMOXICILLIN AND CLAVULANATE POTASSIUM 875; 125 MG/1; MG/1
1 TABLET, FILM COATED ORAL 2 TIMES DAILY
Qty: 20 TABLET | Refills: 0 | Status: SHIPPED | OUTPATIENT
Start: 2023-09-24 | End: 2023-10-04

## 2023-09-24 RX ORDER — LIDOCAINE HYDROCHLORIDE 10 MG/ML
5 INJECTION, SOLUTION INFILTRATION; PERINEURAL ONCE
Status: COMPLETED | OUTPATIENT
Start: 2023-09-24 | End: 2023-09-24

## 2023-09-24 RX ADMIN — AMOXICILLIN AND CLAVULANATE POTASSIUM 1 TABLET: 875; 125 TABLET, FILM COATED ORAL at 20:57

## 2023-09-24 RX ADMIN — LIDOCAINE HYDROCHLORIDE 5 ML: 10 INJECTION, SOLUTION INFILTRATION; PERINEURAL at 19:56

## 2023-09-24 RX ADMIN — TETANUS TOXOID, REDUCED DIPHTHERIA TOXOID AND ACELLULAR PERTUSSIS VACCINE, ADSORBED 0.5 ML: 5; 2.5; 8; 8; 2.5 SUSPENSION INTRAMUSCULAR at 19:54

## 2023-09-24 RX ADMIN — KETOROLAC TROMETHAMINE 60 MG: 60 INJECTION, SOLUTION INTRAMUSCULAR at 19:54

## 2023-09-24 ASSESSMENT — PAIN DESCRIPTION - PAIN TYPE: TYPE: ACUTE PAIN

## 2023-09-24 ASSESSMENT — PAIN SCALES - GENERAL: PAINLEVEL_OUTOF10: 6

## 2023-09-24 ASSESSMENT — PAIN DESCRIPTION - ORIENTATION: ORIENTATION: LEFT

## 2023-09-24 ASSESSMENT — PAIN DESCRIPTION - LOCATION: LOCATION: FINGER (COMMENT WHICH ONE)

## 2023-09-24 ASSESSMENT — PAIN DESCRIPTION - DESCRIPTORS: DESCRIPTORS: THROBBING

## 2023-09-24 ASSESSMENT — PAIN DESCRIPTION - FREQUENCY: FREQUENCY: CONTINUOUS

## 2023-09-25 ENCOUNTER — TELEPHONE (OUTPATIENT)
Dept: FAMILY MEDICINE CLINIC | Age: 38
End: 2023-09-25

## 2023-09-25 NOTE — TELEPHONE ENCOUNTER
[facility] ([facility]) ED Follow up Call    Reason for ED visit:  Laceration Lt Hand     9/25/2023     Nagi Pereira , this is Myrl Grief from Dr. Alexandria Dykes office, just calling to see how you are doing after your recent ED visit. Did you receive discharge instructions? Yes  Do you understand the discharge instructions? Yes  Did the ED give you any new prescriptions? Yes  Were you able to fill your prescriptions? Yes      Do you have one of our red, yellow and green  Zone sheets that help you to determine when you should go to the ED? Not Applicable      Do you need or want to make a follow up appt with your PCP? No    Do you have any further needs in the home, e.g. equipment?   No        FU appts/Provider:    Future Appointments   Date Time Provider [address] [place] [address]   1/11/2024  9:00 AM MD Dandre Aguirre

## 2023-09-25 NOTE — ED PROVIDER NOTES
left fourth finger mostly there is a hematoma under the nail and the nail avulsion is obvious with the bleeding and laceration just below it  NEUROLOGIC: No focal sensory or motor deficits are noted. Gait is normal. Cranial nerves II through XII are intact. DIAGNOSTIC RESULTS     EKG:     RADIOLOGY:         Interpretation per the Radiologist below, if available at the time of this note:    XR HAND LEFT (MIN 3 VIEWS)   Final Result      Comminuted fracturing of the distal tuft of the fourth finger. No foreign body. LABS:  Labs Reviewed - No data to display    All other labs were within normal range or not returned as of this dictation. MIPS          Total Critical Care time was:    EMERGENCY DEPARTMENT COURSE and DIFFERENTIAL DIAGNOSIS/MDM:    Patient Course:    The patient presenting with a crushing injury to the left fourth finger the x-ray did show comminuted fracture to the tip of the left fourth finger distal phalanx    The patient tetanus booster updated    After cleaning the hand thoroughly with Betadine and normal saline the patient had nerve block at the metacarpal phalangeal joint of the fourth finger with infiltrating the area with 1% lidocaine almost 2 to 3 cc also lidocaine was infiltrated to the area of the wound itself    The nail was removed and it was partially attached only on the lateral side medially and laceration just below it was sutured with 10 stitches after mild debridement    There was no exposure of the underlying structures with the patient have a full range of movement    Patient referred to orthopedic as outpatient he also started antibiotic finger splint was applied and the patient to follow-up with a primary care doctor within a week for further evaluation and management and to come back to the ER in case of any new symptoms      ED Medications administered this visit:    Medications   ketorolac (TORADOL) injection 60 mg (60 mg IntraMUSCular Given 9/24/23 1954)

## 2024-01-23 ENCOUNTER — HOSPITAL ENCOUNTER (OUTPATIENT)
Age: 39
Discharge: HOME | End: 2024-01-23
Payer: COMMERCIAL

## 2024-01-23 DIAGNOSIS — I10 PRIMARY HYPERTENSION: ICD-10-CM

## 2024-01-23 DIAGNOSIS — E11.65 UNCONTROLLED TYPE 2 DIABETES MELLITUS WITH HYPERGLYCEMIA (HCC): ICD-10-CM

## 2024-01-23 DIAGNOSIS — E78.00 HYPERCHOLESTEROLEMIA: ICD-10-CM

## 2024-01-23 LAB
ALBUMIN SERPL-MCNC: 4.3 G/DL (ref 3.5–5.2)
ALP SERPL-CCNC: 87 U/L (ref 40–129)
ALT SERPL-CCNC: 99 U/L (ref 5–41)
ANION GAP SERPL CALCULATED.3IONS-SCNC: 15 MMOL/L (ref 9–17)
AST SERPL-CCNC: 65 U/L
BILIRUB SERPL-MCNC: 0.4 MG/DL (ref 0.3–1.2)
BUN SERPL-MCNC: 8 MG/DL (ref 6–20)
BUN/CREAT SERPL: 13 (ref 9–20)
CALCIUM SERPL-MCNC: 9.3 MG/DL (ref 8.6–10.4)
CHLORIDE SERPL-SCNC: 98 MMOL/L (ref 98–107)
CHOLEST SERPL-MCNC: 193 MG/DL (ref 0–199)
CHOLESTEROL/HDL RATIO: 5
CO2 SERPL-SCNC: 22 MMOL/L (ref 20–31)
CREAT SERPL-MCNC: 0.6 MG/DL (ref 0.7–1.2)
CREAT UR-MCNC: 287 MG/DL (ref 39–259)
EST. AVERAGE GLUCOSE BLD GHB EST-MCNC: 217 MG/DL
GFR SERPL CREATININE-BSD FRML MDRD: >60 ML/MIN/1.73M2
GLUCOSE SERPL-MCNC: 216 MG/DL (ref 70–99)
HBA1C MFR BLD: 9.2 % (ref 4–6)
HDLC SERPL-MCNC: 42 MG/DL
LDLC SERPL CALC-MCNC: 113 MG/DL (ref 0–100)
MICROALBUMIN UR-MCNC: 225 MG/L (ref 0–20)
MICROALBUMIN/CREAT UR-RTO: 78 MCG/MG CREAT (ref 0–17)
POTASSIUM SERPL-SCNC: 4 MMOL/L (ref 3.7–5.3)
PROT SERPL-MCNC: 7.2 G/DL (ref 6.4–8.3)
SODIUM SERPL-SCNC: 135 MMOL/L (ref 135–144)
TRIGL SERPL-MCNC: 190 MG/DL
VLDLC SERPL CALC-MCNC: 38 MG/DL

## 2024-01-23 PROCEDURE — 82570 ASSAY OF URINE CREATININE: CPT

## 2024-01-23 PROCEDURE — 83036 HEMOGLOBIN GLYCOSYLATED A1C: CPT

## 2024-01-23 PROCEDURE — 80053 COMPREHEN METABOLIC PANEL: CPT

## 2024-01-23 PROCEDURE — 82043 UR ALBUMIN QUANTITATIVE: CPT

## 2024-01-23 PROCEDURE — 36415 COLL VENOUS BLD VENIPUNCTURE: CPT

## 2024-01-23 PROCEDURE — 80061 LIPID PANEL: CPT

## 2024-01-26 ENCOUNTER — OFFICE VISIT (OUTPATIENT)
Dept: FAMILY MEDICINE CLINIC | Age: 39
End: 2024-01-26
Payer: COMMERCIAL

## 2024-01-26 VITALS
DIASTOLIC BLOOD PRESSURE: 82 MMHG | WEIGHT: 315 LBS | BODY MASS INDEX: 44.1 KG/M2 | HEART RATE: 84 BPM | HEIGHT: 71 IN | SYSTOLIC BLOOD PRESSURE: 132 MMHG

## 2024-01-26 DIAGNOSIS — L40.0 PSORIASIS VULGARIS: ICD-10-CM

## 2024-01-26 DIAGNOSIS — G47.33 OSA ON CPAP: ICD-10-CM

## 2024-01-26 DIAGNOSIS — E78.2 MIXED HYPERCHOLESTEROLEMIA AND HYPERTRIGLYCERIDEMIA: ICD-10-CM

## 2024-01-26 DIAGNOSIS — F41.8 DEPRESSION WITH ANXIETY: ICD-10-CM

## 2024-01-26 DIAGNOSIS — K75.81 NASH (NONALCOHOLIC STEATOHEPATITIS): ICD-10-CM

## 2024-01-26 DIAGNOSIS — E66.01 CLASS 3 SEVERE OBESITY DUE TO EXCESS CALORIES WITH SERIOUS COMORBIDITY AND BODY MASS INDEX (BMI) OF 45.0 TO 49.9 IN ADULT (HCC): ICD-10-CM

## 2024-01-26 DIAGNOSIS — E11.65 UNCONTROLLED TYPE 2 DIABETES MELLITUS WITH HYPERGLYCEMIA (HCC): Primary | ICD-10-CM

## 2024-01-26 PROCEDURE — 3046F HEMOGLOBIN A1C LEVEL >9.0%: CPT | Performed by: INTERNAL MEDICINE

## 2024-01-26 PROCEDURE — G8484 FLU IMMUNIZE NO ADMIN: HCPCS | Performed by: INTERNAL MEDICINE

## 2024-01-26 PROCEDURE — G8417 CALC BMI ABV UP PARAM F/U: HCPCS | Performed by: INTERNAL MEDICINE

## 2024-01-26 PROCEDURE — 99214 OFFICE O/P EST MOD 30 MIN: CPT | Performed by: INTERNAL MEDICINE

## 2024-01-26 PROCEDURE — 1036F TOBACCO NON-USER: CPT | Performed by: INTERNAL MEDICINE

## 2024-01-26 PROCEDURE — 2022F DILAT RTA XM EVC RTNOPTHY: CPT | Performed by: INTERNAL MEDICINE

## 2024-01-26 PROCEDURE — G8427 DOCREV CUR MEDS BY ELIG CLIN: HCPCS | Performed by: INTERNAL MEDICINE

## 2024-01-26 RX ORDER — PRAVASTATIN SODIUM 20 MG
20 TABLET ORAL EVERY EVENING
Qty: 30 TABLET | Refills: 5 | Status: SHIPPED | OUTPATIENT
Start: 2024-01-26

## 2024-01-26 ASSESSMENT — PATIENT HEALTH QUESTIONNAIRE - PHQ9
4. FEELING TIRED OR HAVING LITTLE ENERGY: 0
SUM OF ALL RESPONSES TO PHQ QUESTIONS 1-9: 0
7. TROUBLE CONCENTRATING ON THINGS, SUCH AS READING THE NEWSPAPER OR WATCHING TELEVISION: 0
8. MOVING OR SPEAKING SO SLOWLY THAT OTHER PEOPLE COULD HAVE NOTICED. OR THE OPPOSITE, BEING SO FIGETY OR RESTLESS THAT YOU HAVE BEEN MOVING AROUND A LOT MORE THAN USUAL: 0
SUM OF ALL RESPONSES TO PHQ QUESTIONS 1-9: 0
SUM OF ALL RESPONSES TO PHQ9 QUESTIONS 1 & 2: 0
6. FEELING BAD ABOUT YOURSELF - OR THAT YOU ARE A FAILURE OR HAVE LET YOURSELF OR YOUR FAMILY DOWN: 0
5. POOR APPETITE OR OVEREATING: 0
SUM OF ALL RESPONSES TO PHQ QUESTIONS 1-9: 0
SUM OF ALL RESPONSES TO PHQ QUESTIONS 1-9: 0
9. THOUGHTS THAT YOU WOULD BE BETTER OFF DEAD, OR OF HURTING YOURSELF: 0
2. FEELING DOWN, DEPRESSED OR HOPELESS: 0
1. LITTLE INTEREST OR PLEASURE IN DOING THINGS: 0
3. TROUBLE FALLING OR STAYING ASLEEP: 0

## 2024-01-26 ASSESSMENT — ENCOUNTER SYMPTOMS: SHORTNESS OF BREATH: 0

## 2024-01-26 NOTE — PATIENT INSTRUCTIONS
Metabolic Panel; Future  - Lipid Panel; Future        Magan [place] was instructed to follow up in the clinic in 3 months for check up or as needed with any medical issues.

## 2024-01-26 NOTE — PROGRESS NOTES
Not on file    Social History Narrative      Not on file    Social Determinants of Health  Financial Resource Strain: Low Risk  (2/28/2023)      Overall Financial Resource Strain (CARDIA)          Difficulty of Paying Living Expenses: Not very hard  Recent Concern: Financial Resource Strain - Medium Risk (1/10/2023)      Overall Financial Resource Strain (CARDIA)          Difficulty of Paying Living Expenses: Somewhat hard  Food Insecurity: Not on file (2/28/2023)  Transportation Needs: Unknown (2/28/2023)      PRAPARE - Transportation          Lack of Transportation (Medical): Not on file          Lack of Transportation (Non-Medical): No  Physical Activity: Not on file  Stress: Not on file  Social Connections: Not on file  Intimate Partner Violence: Not on file  Housing Stability: Unknown (2/28/2023)      Housing Stability Vital Sign          Unable to Pay for Housing in the Last Year: Not on file          Number of Places Lived in the Last Year: Not on file          Unstable Housing in the Last Year: No    Review of patient's family history indicates:  Problem: No Known Problems      Relation: Mother          Age of Onset: (Not Specified)  Problem: No Known Problems      Relation: Father          Age of Onset: (Not Specified)      Current Outpatient Medications on File Prior to Visit:  Semaglutide, 2 MG/DOSE, (OZEMPIC, 2 MG/DOSE,) 8 MG/3ML SOPN, Inject 2 mg into the skin once a week, Disp: 3 mL, Rfl: 5  metFORMIN (GLUCOPHAGE-XR) 500 MG extended release tablet, TAKE 1 TABLET BY MOUTH DAILY with breakfast, Disp: 30 tablet, Rfl: 5  sertraline (ZOLOFT) 100 MG tablet, Take 1 tablet by mouth daily, Disp: 30 tablet, Rfl: 5  lisinopril (PRINIVIL;ZESTRIL) 5 MG tablet, Take 1 tablet by mouth daily, Disp: 90 tablet, Rfl: 1  pravastatin (PRAVACHOL) 20 MG tablet, Take 1 tablet by mouth every evening, Disp: 30 tablet, Rfl: 5  Risankizumab-rzaa (SKYRIZI, 150 MG DOSE, SC), Inject into the skin every 3 months , Disp: , Rfl:

## 2024-02-27 DIAGNOSIS — F32.4 MAJOR DEPRESSIVE DISORDER WITH SINGLE EPISODE, IN PARTIAL REMISSION (HCC): ICD-10-CM

## 2024-02-27 DIAGNOSIS — I10 PRIMARY HYPERTENSION: ICD-10-CM

## 2024-02-27 DIAGNOSIS — E11.65 UNCONTROLLED TYPE 2 DIABETES MELLITUS WITH HYPERGLYCEMIA (HCC): ICD-10-CM

## 2024-02-28 RX ORDER — SERTRALINE HYDROCHLORIDE 100 MG/1
100 TABLET, FILM COATED ORAL DAILY
Qty: 30 TABLET | Refills: 5 | Status: SHIPPED | OUTPATIENT
Start: 2024-02-28

## 2024-02-28 RX ORDER — LISINOPRIL 5 MG/1
5 TABLET ORAL DAILY
Qty: 90 TABLET | Refills: 1 | Status: SHIPPED | OUTPATIENT
Start: 2024-02-28

## 2024-02-28 RX ORDER — METFORMIN HYDROCHLORIDE 500 MG/1
TABLET, EXTENDED RELEASE ORAL
Qty: 30 TABLET | Refills: 5 | Status: SHIPPED | OUTPATIENT
Start: 2024-02-28

## 2024-05-18 ENCOUNTER — HOSPITAL ENCOUNTER (OUTPATIENT)
Age: 39
Discharge: HOME | End: 2024-05-18
Payer: COMMERCIAL

## 2024-05-18 DIAGNOSIS — E78.2 MIXED HYPERCHOLESTEROLEMIA AND HYPERTRIGLYCERIDEMIA: ICD-10-CM

## 2024-05-18 DIAGNOSIS — E11.65 UNCONTROLLED TYPE 2 DIABETES MELLITUS WITH HYPERGLYCEMIA (HCC): ICD-10-CM

## 2024-05-18 LAB
ALBUMIN SERPL-MCNC: 4.4 G/DL (ref 3.5–5.2)
ALP SERPL-CCNC: 81 U/L (ref 40–129)
ALT SERPL-CCNC: 73 U/L (ref 5–41)
ANION GAP SERPL CALCULATED.3IONS-SCNC: 9 MMOL/L (ref 9–17)
AST SERPL-CCNC: 46 U/L
BILIRUB SERPL-MCNC: 0.5 MG/DL (ref 0.3–1.2)
BUN SERPL-MCNC: 8 MG/DL (ref 6–20)
BUN/CREAT SERPL: 13 (ref 9–20)
CALCIUM SERPL-MCNC: 9.2 MG/DL (ref 8.6–10.4)
CHLORIDE SERPL-SCNC: 100 MMOL/L (ref 98–107)
CHOLEST SERPL-MCNC: 184 MG/DL (ref 0–199)
CHOLESTEROL/HDL RATIO: 4
CO2 SERPL-SCNC: 27 MMOL/L (ref 20–31)
CREAT SERPL-MCNC: 0.6 MG/DL (ref 0.7–1.2)
CREAT UR-MCNC: 192 MG/DL (ref 39–259)
EST. AVERAGE GLUCOSE BLD GHB EST-MCNC: 194 MG/DL
GFR, ESTIMATED: >90 ML/MIN/1.73M2
GLUCOSE SERPL-MCNC: 155 MG/DL (ref 70–99)
HBA1C MFR BLD: 8.4 % (ref 4–6)
HDLC SERPL-MCNC: 49 MG/DL
LDLC SERPL CALC-MCNC: 110 MG/DL (ref 0–100)
MICROALBUMIN UR-MCNC: <12 MG/L (ref 0–20)
MICROALBUMIN/CREAT UR-RTO: NORMAL MCG/MG CREAT (ref 0–17)
POTASSIUM SERPL-SCNC: 4.3 MMOL/L (ref 3.7–5.3)
PROT SERPL-MCNC: 7.5 G/DL (ref 6.4–8.3)
SODIUM SERPL-SCNC: 136 MMOL/L (ref 135–144)
TRIGL SERPL-MCNC: 127 MG/DL
VLDLC SERPL CALC-MCNC: 25 MG/DL

## 2024-05-18 PROCEDURE — 82570 ASSAY OF URINE CREATININE: CPT

## 2024-05-18 PROCEDURE — 82043 UR ALBUMIN QUANTITATIVE: CPT

## 2024-05-18 PROCEDURE — 80061 LIPID PANEL: CPT

## 2024-05-18 PROCEDURE — 83036 HEMOGLOBIN GLYCOSYLATED A1C: CPT

## 2024-05-18 PROCEDURE — 36415 COLL VENOUS BLD VENIPUNCTURE: CPT

## 2024-05-18 PROCEDURE — 80053 COMPREHEN METABOLIC PANEL: CPT

## 2024-05-20 ENCOUNTER — OFFICE VISIT (OUTPATIENT)
Dept: FAMILY MEDICINE CLINIC | Age: 39
End: 2024-05-20
Payer: COMMERCIAL

## 2024-05-20 VITALS
BODY MASS INDEX: 44.1 KG/M2 | HEART RATE: 89 BPM | SYSTOLIC BLOOD PRESSURE: 111 MMHG | OXYGEN SATURATION: 97 % | WEIGHT: 315 LBS | HEIGHT: 71 IN | DIASTOLIC BLOOD PRESSURE: 72 MMHG

## 2024-05-20 DIAGNOSIS — G47.33 OSA ON CPAP: ICD-10-CM

## 2024-05-20 DIAGNOSIS — K75.81 NASH (NONALCOHOLIC STEATOHEPATITIS): ICD-10-CM

## 2024-05-20 DIAGNOSIS — E78.2 MIXED HYPERCHOLESTEROLEMIA AND HYPERTRIGLYCERIDEMIA: ICD-10-CM

## 2024-05-20 DIAGNOSIS — L40.9 PSORIASIS: ICD-10-CM

## 2024-05-20 DIAGNOSIS — E66.01 CLASS 3 SEVERE OBESITY DUE TO EXCESS CALORIES WITH SERIOUS COMORBIDITY AND BODY MASS INDEX (BMI) OF 45.0 TO 49.9 IN ADULT (HCC): ICD-10-CM

## 2024-05-20 DIAGNOSIS — E11.65 UNCONTROLLED TYPE 2 DIABETES MELLITUS WITH HYPERGLYCEMIA (HCC): Primary | ICD-10-CM

## 2024-05-20 PROCEDURE — G8427 DOCREV CUR MEDS BY ELIG CLIN: HCPCS | Performed by: INTERNAL MEDICINE

## 2024-05-20 PROCEDURE — 1036F TOBACCO NON-USER: CPT | Performed by: INTERNAL MEDICINE

## 2024-05-20 PROCEDURE — 3052F HG A1C>EQUAL 8.0%<EQUAL 9.0%: CPT | Performed by: INTERNAL MEDICINE

## 2024-05-20 PROCEDURE — 2022F DILAT RTA XM EVC RTNOPTHY: CPT | Performed by: INTERNAL MEDICINE

## 2024-05-20 PROCEDURE — 99214 OFFICE O/P EST MOD 30 MIN: CPT | Performed by: INTERNAL MEDICINE

## 2024-05-20 PROCEDURE — G8417 CALC BMI ABV UP PARAM F/U: HCPCS | Performed by: INTERNAL MEDICINE

## 2024-05-20 RX ORDER — METFORMIN HYDROCHLORIDE 500 MG/1
1000 TABLET, EXTENDED RELEASE ORAL DAILY
Qty: 60 TABLET | Refills: 5 | Status: SHIPPED | OUTPATIENT
Start: 2024-05-20

## 2024-05-20 RX ORDER — PRAVASTATIN SODIUM 40 MG
40 TABLET ORAL DAILY
Qty: 30 TABLET | Refills: 5 | Status: SHIPPED | OUTPATIENT
Start: 2024-05-20

## 2024-05-20 SDOH — ECONOMIC STABILITY: FOOD INSECURITY: WITHIN THE PAST 12 MONTHS, THE FOOD YOU BOUGHT JUST DIDN'T LAST AND YOU DIDN'T HAVE MONEY TO GET MORE.: NEVER TRUE

## 2024-05-20 SDOH — ECONOMIC STABILITY: FOOD INSECURITY: WITHIN THE PAST 12 MONTHS, YOU WORRIED THAT YOUR FOOD WOULD RUN OUT BEFORE YOU GOT MONEY TO BUY MORE.: NEVER TRUE

## 2024-05-20 SDOH — ECONOMIC STABILITY: INCOME INSECURITY: HOW HARD IS IT FOR YOU TO PAY FOR THE VERY BASICS LIKE FOOD, HOUSING, MEDICAL CARE, AND HEATING?: NOT HARD AT ALL

## 2024-05-20 ASSESSMENT — ENCOUNTER SYMPTOMS
ABDOMINAL PAIN: 0
NAUSEA: 0
RESPIRATORY NEGATIVE: 1
DIARRHEA: 0
CONSTIPATION: 0
SHORTNESS OF BREATH: 0
BLOOD IN STOOL: 0
SORE THROAT: 0

## 2024-05-20 NOTE — PROGRESS NOTES
Respiratory: Negative.  Negative for shortness of breath.    Cardiovascular:  Negative for chest pain, palpitations and leg swelling.   Gastrointestinal:  Negative for abdominal pain, blood in stool, constipation, diarrhea and nausea.   Genitourinary:  Negative for difficulty urinating, dysuria, frequency and urgency.   Musculoskeletal:  Negative for arthralgias, joint swelling, myalgias, neck pain and neck stiffness.   Skin: Negative.    Neurological:  Negative for syncope.   Psychiatric/Behavioral: Negative.            Objective   Physical Exam  Vitals and nursing note reviewed.   Constitutional:       Appearance: He is well-developed. He is obese.   HENT:      Head: Atraumatic.   Eyes:      Conjunctiva/sclera: Conjunctivae normal.   Cardiovascular:      Rate and Rhythm: Normal rate and regular rhythm.      Heart sounds: Normal heart sounds.   Pulmonary:      Effort: Pulmonary effort is normal.      Breath sounds: Normal breath sounds.   Abdominal:      Palpations: Abdomen is soft.      Tenderness: There is no abdominal tenderness.   Musculoskeletal:      Cervical back: Normal range of motion and neck supple.   Lymphadenopathy:      Cervical: No cervical adenopathy.   Skin:     Findings: No rash.   Neurological:      Mental Status: He is alert.   Psychiatric:         Behavior: Behavior normal.         Thought Content: Thought content normal.                  An electronic signature was used to authenticate this note.    --Charles Naranjo MD

## 2024-05-31 ENCOUNTER — OFFICE VISIT (OUTPATIENT)
Dept: FAMILY MEDICINE CLINIC | Age: 39
End: 2024-05-31
Payer: COMMERCIAL

## 2024-05-31 VITALS
DIASTOLIC BLOOD PRESSURE: 82 MMHG | SYSTOLIC BLOOD PRESSURE: 132 MMHG | HEIGHT: 71 IN | WEIGHT: 315 LBS | BODY MASS INDEX: 44.1 KG/M2 | HEART RATE: 108 BPM

## 2024-05-31 DIAGNOSIS — J40 BRONCHITIS: Primary | ICD-10-CM

## 2024-05-31 PROCEDURE — G8427 DOCREV CUR MEDS BY ELIG CLIN: HCPCS | Performed by: INTERNAL MEDICINE

## 2024-05-31 PROCEDURE — 99213 OFFICE O/P EST LOW 20 MIN: CPT | Performed by: INTERNAL MEDICINE

## 2024-05-31 PROCEDURE — 1036F TOBACCO NON-USER: CPT | Performed by: INTERNAL MEDICINE

## 2024-05-31 PROCEDURE — G8417 CALC BMI ABV UP PARAM F/U: HCPCS | Performed by: INTERNAL MEDICINE

## 2024-05-31 RX ORDER — FLUTICASONE FUROATE, UMECLIDINIUM BROMIDE AND VILANTEROL TRIFENATATE 100; 62.5; 25 UG/1; UG/1; UG/1
1 POWDER RESPIRATORY (INHALATION) DAILY
Qty: 1 EACH | Refills: 0 | Status: SHIPPED | COMMUNITY
Start: 2024-05-31

## 2024-05-31 RX ORDER — DOXYCYCLINE HYCLATE 100 MG
100 TABLET ORAL 2 TIMES DAILY
Qty: 20 TABLET | Refills: 0 | Status: SHIPPED | OUTPATIENT
Start: 2024-05-31 | End: 2024-06-10

## 2024-05-31 ASSESSMENT — ENCOUNTER SYMPTOMS
WHEEZING: 1
SORE THROAT: 0
ABDOMINAL PAIN: 0
BLOOD IN STOOL: 0
COUGH: 1
NAUSEA: 0
CONSTIPATION: 0
DIARRHEA: 0

## 2024-05-31 NOTE — PATIENT INSTRUCTIONS
Survey:     You may be receiving a survey from [facility] regarding your [facility] today.     You may get this in the mail, through your MyChart or in your email.      Please complete the survey to enable us to provide the highest quality of care to you and your family. Please also, mention our names.     If you cannot score us as very good (5 Stars) on any question, please feel free to call the office to discuss how we could have made your experience exceptional.      Thank You!        Dr. Naranjo, MD Burns, JOSE Hooker, DORETHA Rudolph CNP, DAYSI Jolly MA       Plan:  1. Bronchitis  Take Doxycycline 100 mg two times a day x 10 days.   Take Trelegy 100:  1 puff daily - rinse after use.   Magan is instructed to return to the clinic if the symptoms continue or worsen.  Magan  was also instructed to go to the emergency room department if the symptoms significantly worsen before an appointment can be made.

## 2024-05-31 NOTE — PROGRESS NOTES
Negative for abdominal pain, blood in stool, constipation, diarrhea and nausea.   Genitourinary:  Negative for difficulty urinating, dysuria, frequency and urgency.   Musculoskeletal:  Negative for arthralgias, joint swelling, myalgias, neck pain and neck stiffness.   Skin: Negative.    Neurological:  Negative for syncope.   Psychiatric/Behavioral: Negative.            Objective   Physical Exam  Vitals and nursing note reviewed.   Constitutional:       Appearance: He is well-developed. He is obese.   HENT:      Head: Atraumatic.   Eyes:      Conjunctiva/sclera: Conjunctivae normal.   Cardiovascular:      Rate and Rhythm: Normal rate and regular rhythm.      Heart sounds: Normal heart sounds.   Pulmonary:      Effort: Pulmonary effort is normal.      Breath sounds: Wheezing present.   Abdominal:      Palpations: Abdomen is soft.      Tenderness: There is no abdominal tenderness.   Musculoskeletal:      Cervical back: Normal range of motion and neck supple.   Lymphadenopathy:      Cervical: No cervical adenopathy.   Skin:     Findings: No rash.   Neurological:      Mental Status: He is alert.   Psychiatric:         Behavior: Behavior normal.         Thought Content: Thought content normal.                  An electronic signature was used to authenticate this note.    --Charles Naranjo MD

## 2024-06-02 NOTE — PROGRESS NOTES
Treatment Pain:  1/10    Time In: 0830    Time Out : 0910        Timed Code Treatment Minutes: 40 Minutes  Total Treatment Time: 36 Minutes    MIGUE OJEDA PT     Date: 2/15/2022 [FreeTextEntry3] : Cataracts [FreeTextEntry5] : CAD [de-identified] : Melanoma [de-identified] : Diabetes

## 2024-06-11 ENCOUNTER — HOSPITAL ENCOUNTER (OUTPATIENT)
Age: 39
Discharge: HOME | End: 2024-06-11
Payer: COMMERCIAL

## 2024-06-11 PROCEDURE — 86480 TB TEST CELL IMMUN MEASURE: CPT

## 2024-06-11 PROCEDURE — 36415 COLL VENOUS BLD VENIPUNCTURE: CPT

## 2024-06-14 LAB
QUANTI TB GOLD PLUS: NEGATIVE
QUANTI TB1 MINUS NIL: NORMAL IU/ML
QUANTI TB2 MINUS NIL: 0 IU/ML
QUANTIFERON MITOGEN: 9.92 IU/ML
QUANTIFERON NIL: 0.08 IU/ML

## 2024-08-05 RX ORDER — SEMAGLUTIDE 2.68 MG/ML
2 INJECTION, SOLUTION SUBCUTANEOUS WEEKLY
Qty: 3 ML | Refills: 5 | OUTPATIENT
Start: 2024-08-05

## 2024-08-07 DIAGNOSIS — E78.2 MIXED HYPERCHOLESTEROLEMIA AND HYPERTRIGLYCERIDEMIA: ICD-10-CM

## 2024-08-07 RX ORDER — PRAVASTATIN SODIUM 20 MG
20 TABLET ORAL EVERY EVENING
Qty: 30 TABLET | Refills: 5 | OUTPATIENT
Start: 2024-08-07

## 2024-08-29 ENCOUNTER — PATIENT MESSAGE (OUTPATIENT)
Dept: FAMILY MEDICINE CLINIC | Age: 39
End: 2024-08-29

## 2024-08-29 DIAGNOSIS — E78.2 MIXED HYPERCHOLESTEROLEMIA AND HYPERTRIGLYCERIDEMIA: ICD-10-CM

## 2024-08-29 DIAGNOSIS — I10 PRIMARY HYPERTENSION: ICD-10-CM

## 2024-08-29 DIAGNOSIS — E11.65 UNCONTROLLED TYPE 2 DIABETES MELLITUS WITH HYPERGLYCEMIA (HCC): Primary | ICD-10-CM

## 2024-08-29 RX ORDER — LISINOPRIL 5 MG/1
5 TABLET ORAL DAILY
Qty: 90 TABLET | Refills: 1 | Status: SHIPPED | OUTPATIENT
Start: 2024-08-29

## 2024-08-29 RX ORDER — SEMAGLUTIDE 2.68 MG/ML
2 INJECTION, SOLUTION SUBCUTANEOUS WEEKLY
Qty: 3 ML | Refills: 5 | Status: SHIPPED | OUTPATIENT
Start: 2024-08-29

## 2024-08-29 RX ORDER — PRAVASTATIN SODIUM 40 MG
40 TABLET ORAL DAILY
Qty: 30 TABLET | Refills: 5 | Status: SHIPPED | OUTPATIENT
Start: 2024-08-29

## 2024-08-29 RX ORDER — TIRZEPATIDE 7.5 MG/.5ML
7.5 INJECTION, SOLUTION SUBCUTANEOUS WEEKLY
Qty: 2 ML | Refills: 5 | Status: SHIPPED | OUTPATIENT
Start: 2024-08-29

## 2024-09-03 PROBLEM — E11.65 UNCONTROLLED TYPE 2 DIABETES MELLITUS WITH HYPERGLYCEMIA (HCC): Chronic | Status: ACTIVE | Noted: 2022-01-07

## 2024-09-09 ENCOUNTER — TELEPHONE (OUTPATIENT)
Dept: FAMILY MEDICINE CLINIC | Age: 39
End: 2024-09-09

## 2024-10-26 DIAGNOSIS — F32.4 MAJOR DEPRESSIVE DISORDER WITH SINGLE EPISODE, IN PARTIAL REMISSION (HCC): ICD-10-CM

## 2024-10-28 RX ORDER — SERTRALINE HYDROCHLORIDE 100 MG/1
100 TABLET, FILM COATED ORAL DAILY
Qty: 30 TABLET | Refills: 0 | Status: SHIPPED | OUTPATIENT
Start: 2024-10-28 | End: 2024-11-01

## 2024-10-28 NOTE — TELEPHONE ENCOUNTER
Last OV: 5/31/2024  Last RX:    Next scheduled apt: Visit date not found    Pt is due for an appointment. Left vm for pt to call office back.

## 2024-10-30 ENCOUNTER — HOSPITAL ENCOUNTER (OUTPATIENT)
Age: 39
Discharge: HOME | End: 2024-10-30
Payer: COMMERCIAL

## 2024-10-30 DIAGNOSIS — E11.65 UNCONTROLLED TYPE 2 DIABETES MELLITUS WITH HYPERGLYCEMIA (HCC): ICD-10-CM

## 2024-10-30 DIAGNOSIS — E78.2 MIXED HYPERCHOLESTEROLEMIA AND HYPERTRIGLYCERIDEMIA: ICD-10-CM

## 2024-10-30 LAB
ALBUMIN SERPL-MCNC: 4.2 G/DL (ref 3.5–5.2)
ALP SERPL-CCNC: 67 U/L (ref 40–129)
ALT SERPL-CCNC: 92 U/L (ref 5–41)
ANION GAP SERPL CALCULATED.3IONS-SCNC: 11 MMOL/L (ref 9–17)
AST SERPL-CCNC: 65 U/L
BILIRUB SERPL-MCNC: 0.4 MG/DL (ref 0.3–1.2)
BUN SERPL-MCNC: 10 MG/DL (ref 6–20)
BUN/CREAT SERPL: 17 (ref 9–20)
CALCIUM SERPL-MCNC: 9.5 MG/DL (ref 8.6–10.4)
CHLORIDE SERPL-SCNC: 104 MMOL/L (ref 98–107)
CHOLEST SERPL-MCNC: 175 MG/DL (ref 0–199)
CHOLESTEROL/HDL RATIO: 3.6
CO2 SERPL-SCNC: 26 MMOL/L (ref 20–31)
CREAT SERPL-MCNC: 0.6 MG/DL (ref 0.7–1.2)
EST. AVERAGE GLUCOSE BLD GHB EST-MCNC: 200 MG/DL
GFR, ESTIMATED: >90 ML/MIN/1.73M2
GLUCOSE SERPL-MCNC: 135 MG/DL (ref 70–99)
HBA1C MFR BLD: 8.6 % (ref 4–6)
HDLC SERPL-MCNC: 48 MG/DL
LDLC SERPL CALC-MCNC: 95 MG/DL (ref 0–100)
POTASSIUM SERPL-SCNC: 4.1 MMOL/L (ref 3.7–5.3)
PROT SERPL-MCNC: 7.5 G/DL (ref 6.4–8.3)
SODIUM SERPL-SCNC: 141 MMOL/L (ref 135–144)
TRIGL SERPL-MCNC: 160 MG/DL
VLDLC SERPL CALC-MCNC: 32 MG/DL (ref 1–30)

## 2024-10-30 PROCEDURE — 83036 HEMOGLOBIN GLYCOSYLATED A1C: CPT

## 2024-10-30 PROCEDURE — 80061 LIPID PANEL: CPT

## 2024-10-30 PROCEDURE — 36415 COLL VENOUS BLD VENIPUNCTURE: CPT

## 2024-10-30 PROCEDURE — 80053 COMPREHEN METABOLIC PANEL: CPT

## 2024-11-01 ENCOUNTER — TELEPHONE (OUTPATIENT)
Dept: FAMILY MEDICINE CLINIC | Age: 39
End: 2024-11-01

## 2024-11-01 ENCOUNTER — OFFICE VISIT (OUTPATIENT)
Dept: FAMILY MEDICINE CLINIC | Age: 39
End: 2024-11-01
Payer: COMMERCIAL

## 2024-11-01 VITALS
BODY MASS INDEX: 44.1 KG/M2 | DIASTOLIC BLOOD PRESSURE: 80 MMHG | HEIGHT: 71 IN | HEART RATE: 96 BPM | SYSTOLIC BLOOD PRESSURE: 138 MMHG | WEIGHT: 315 LBS

## 2024-11-01 DIAGNOSIS — G47.33 OSA ON CPAP: ICD-10-CM

## 2024-11-01 DIAGNOSIS — F32.1 CURRENT MODERATE EPISODE OF MAJOR DEPRESSIVE DISORDER WITHOUT PRIOR EPISODE (HCC): ICD-10-CM

## 2024-11-01 DIAGNOSIS — K75.81 NASH (NONALCOHOLIC STEATOHEPATITIS): ICD-10-CM

## 2024-11-01 DIAGNOSIS — L40.0 PSORIASIS VULGARIS: ICD-10-CM

## 2024-11-01 DIAGNOSIS — E11.65 UNCONTROLLED TYPE 2 DIABETES MELLITUS WITH HYPERGLYCEMIA (HCC): Primary | ICD-10-CM

## 2024-11-01 PROCEDURE — 2022F DILAT RTA XM EVC RTNOPTHY: CPT | Performed by: INTERNAL MEDICINE

## 2024-11-01 PROCEDURE — G8484 FLU IMMUNIZE NO ADMIN: HCPCS | Performed by: INTERNAL MEDICINE

## 2024-11-01 PROCEDURE — 99214 OFFICE O/P EST MOD 30 MIN: CPT | Performed by: INTERNAL MEDICINE

## 2024-11-01 PROCEDURE — G8427 DOCREV CUR MEDS BY ELIG CLIN: HCPCS | Performed by: INTERNAL MEDICINE

## 2024-11-01 PROCEDURE — G8417 CALC BMI ABV UP PARAM F/U: HCPCS | Performed by: INTERNAL MEDICINE

## 2024-11-01 PROCEDURE — 1036F TOBACCO NON-USER: CPT | Performed by: INTERNAL MEDICINE

## 2024-11-01 PROCEDURE — 3052F HG A1C>EQUAL 8.0%<EQUAL 9.0%: CPT | Performed by: INTERNAL MEDICINE

## 2024-11-01 RX ORDER — INSULIN GLARGINE 100 [IU]/ML
20 INJECTION, SOLUTION SUBCUTANEOUS DAILY
Qty: 5 ADJUSTABLE DOSE PRE-FILLED PEN SYRINGE | Refills: 3 | Status: SHIPPED | OUTPATIENT
Start: 2024-11-01

## 2024-11-01 RX ORDER — NEEDLES, DISPOSABLE 25GX5/8"
NEEDLE, DISPOSABLE MISCELLANEOUS
Qty: 100 EACH | Refills: 5 | Status: SHIPPED | OUTPATIENT
Start: 2024-11-01

## 2024-11-01 RX ORDER — VENLAFAXINE HYDROCHLORIDE 150 MG/1
150 CAPSULE, EXTENDED RELEASE ORAL DAILY
Qty: 30 CAPSULE | Refills: 5 | Status: SHIPPED | OUTPATIENT
Start: 2024-11-01

## 2024-11-01 ASSESSMENT — ENCOUNTER SYMPTOMS
NAUSEA: 0
ABDOMINAL PAIN: 0
DIARRHEA: 0
CONSTIPATION: 0
SORE THROAT: 0
BLOOD IN STOOL: 0
SHORTNESS OF BREATH: 0
RESPIRATORY NEGATIVE: 1

## 2024-11-01 NOTE — ASSESSMENT & PLAN NOTE
Not controlled on Ozempic and Metformin.  No change in medication but will add Levemir 20 units every morning.    Obtain labs approximately one week prior to the office appointment.  Please fast for 12 hours prior to obtaining the labs.  Water or black coffee (no cream or sugar) is allowed prior to the the labs.      Orders:    insulin detemir (LEVEMIR FLEXPEN) 100 UNIT/ML injection pen; Inject 20 Units into the skin nightly    Comprehensive Metabolic Panel; Future    Hemoglobin A1C; Future    NEEDLE, DISP, 30 G (BD DISP NEEDLES) 30G X 1/2\" MISC; Injection daily.

## 2024-11-01 NOTE — TELEPHONE ENCOUNTER
Pt called stating that he got a message from [facility] regarding Levemir.     Called drug [facility] in regards to medication. Confirmed that Levemir is currently on back-order. Advised pt call local pharmacies and see if anyone else has it in stock currently.

## 2024-11-01 NOTE — PROGRESS NOTES
Thought content normal.                  An electronic signature was used to authenticate this note.    --Charles Naranjo MD

## 2024-11-01 NOTE — PATIENT INSTRUCTIONS
Survey:     You may be receiving a survey from [employer] regarding your visit today.     You may get this in the mail, through your MyChart or in your email.      Please complete the survey to enable us to provide the highest quality of care to you and your family. Please also, mention our names.     If you cannot score us as very good (5 Stars) on any question, please feel free to call the office to discuss how we could have made your experience exceptional.      Thank You!        Dr. Naranjo, MD Burns, JOSE Hooker, DAYSI Hollis, APRN MARILYN Wong, DAYSI Jolly, CMA       Assessment & Plan  Uncontrolled type 2 diabetes mellitus with hyperglycemia (HCC)   Not controlled on Ozempic and Metformin.  No change in medication but will add Levemir 20 units every morning.    Obtain labs approximately one week prior to the office appointment.  Please fast for 12 hours prior to obtaining the labs.  Water or black coffee (no cream or sugar) is allowed prior to the the labs.      Orders:    insulin detemir (LEVEMIR FLEXPEN) 100 UNIT/ML injection pen; Inject 20 Units into the skin nightly    Comprehensive Metabolic Panel; Future    Hemoglobin A1C; Future    NEEDLE, DISP, 30 G (BD DISP NEEDLES) 30G X 1/2\" MISC; Injection daily.    Psoriasis vulgaris   Controlled on Skyrizi.           WARD (nonalcoholic steatohepatitis)   Discussed the need for weight loss / exercise.  He does need to seen GI - has to reschedule appt.         ROSA on CPAP   Controlled on CPAP.           Current moderate episode of major depressive disorder without prior episode (HCC)   Not controlled on Zoloft.  Change to Effexor  mg daily.  Follow up in 1 month for a recheck.    Orders:    venlafaxine (EFFEXOR XR) 150 MG extended release capsule; Take 1 capsule by mouth daily      Follow up in 1 month.

## 2024-11-01 NOTE — ASSESSMENT & PLAN NOTE
Discussed the need for weight loss / exercise.  He does need to seen GI - has to reschedule appt.

## 2024-11-04 DIAGNOSIS — E11.65 UNCONTROLLED TYPE 2 DIABETES MELLITUS WITH HYPERGLYCEMIA (HCC): ICD-10-CM

## 2024-11-04 RX ORDER — INSULIN GLARGINE 100 [IU]/ML
20 INJECTION, SOLUTION SUBCUTANEOUS DAILY
Qty: 5 ADJUSTABLE DOSE PRE-FILLED PEN SYRINGE | Refills: 3 | OUTPATIENT
Start: 2024-11-04

## 2024-11-04 NOTE — TELEPHONE ENCOUNTER
Insurance did not cover Levemir, changed to lantus. Insurance is preferring basaglar. Verbal order given to [facility].

## 2024-12-06 ENCOUNTER — HOSPITAL ENCOUNTER (OUTPATIENT)
Age: 39
Discharge: HOME | End: 2024-12-06

## 2024-12-09 ENCOUNTER — OFFICE VISIT (OUTPATIENT)
Dept: FAMILY MEDICINE CLINIC | Age: 39
End: 2024-12-09
Payer: COMMERCIAL

## 2024-12-09 VITALS
HEIGHT: 71 IN | HEART RATE: 98 BPM | WEIGHT: 315 LBS | BODY MASS INDEX: 44.1 KG/M2 | SYSTOLIC BLOOD PRESSURE: 136 MMHG | DIASTOLIC BLOOD PRESSURE: 82 MMHG

## 2024-12-09 DIAGNOSIS — F32.1 CURRENT MODERATE EPISODE OF MAJOR DEPRESSIVE DISORDER WITHOUT PRIOR EPISODE (HCC): Primary | ICD-10-CM

## 2024-12-09 DIAGNOSIS — E11.65 UNCONTROLLED TYPE 2 DIABETES MELLITUS WITH HYPERGLYCEMIA (HCC): ICD-10-CM

## 2024-12-09 PROCEDURE — 3052F HG A1C>EQUAL 8.0%<EQUAL 9.0%: CPT | Performed by: INTERNAL MEDICINE

## 2024-12-09 PROCEDURE — 1036F TOBACCO NON-USER: CPT | Performed by: INTERNAL MEDICINE

## 2024-12-09 PROCEDURE — G8417 CALC BMI ABV UP PARAM F/U: HCPCS | Performed by: INTERNAL MEDICINE

## 2024-12-09 PROCEDURE — G8484 FLU IMMUNIZE NO ADMIN: HCPCS | Performed by: INTERNAL MEDICINE

## 2024-12-09 PROCEDURE — 2022F DILAT RTA XM EVC RTNOPTHY: CPT | Performed by: INTERNAL MEDICINE

## 2024-12-09 PROCEDURE — G8427 DOCREV CUR MEDS BY ELIG CLIN: HCPCS | Performed by: INTERNAL MEDICINE

## 2024-12-09 PROCEDURE — 99213 OFFICE O/P EST LOW 20 MIN: CPT | Performed by: INTERNAL MEDICINE

## 2024-12-09 RX ORDER — ACYCLOVIR 400 MG/1
TABLET ORAL
Qty: 1 EACH | Refills: 0 | Status: SHIPPED | OUTPATIENT
Start: 2024-12-09

## 2024-12-09 RX ORDER — ACYCLOVIR 400 MG/1
TABLET ORAL
Qty: 3 EACH | Refills: 5 | Status: SHIPPED | OUTPATIENT
Start: 2024-12-09

## 2024-12-09 RX ORDER — VENLAFAXINE HYDROCHLORIDE 37.5 MG/1
37.5 CAPSULE, EXTENDED RELEASE ORAL DAILY
Qty: 30 CAPSULE | Refills: 5 | Status: SHIPPED | OUTPATIENT
Start: 2024-12-09

## 2024-12-09 ASSESSMENT — ENCOUNTER SYMPTOMS
BLOOD IN STOOL: 0
DIARRHEA: 0
RESPIRATORY NEGATIVE: 1
NAUSEA: 0
ABDOMINAL PAIN: 0
SORE THROAT: 0
CONSTIPATION: 0

## 2024-12-09 NOTE — PATIENT INSTRUCTIONS
Survey:     You may be receiving a survey from [facility] regarding your visit today.     You may get this in the mail, through your MyChart or in your email.      Please complete the survey to enable us to provide the highest quality of care to you and your family. Please also, mention our names.     If you cannot score us as very good (5 Stars) on any question, please feel free to call the office to discuss how we could have made your experience exceptional.      Thank You!        Dr. Naranjo, MD Burns, JOSE Hooker, DAYSI Hollis, APRN MARILYN Wong, DAYSI Gonzalez, CMA       Assessment & Plan  Current moderate episode of major depressive disorder without prior episode (HCC)   Improving but not well controlled.  Add Effexor XR 37.5 mg by mouth daily to the 150 mg dose.    Orders:    venlafaxine (EFFEXOR XR) 37.5 MG extended release capsule; Take 1 capsule by mouth daily Take with the 150 mg dose.    Uncontrolled type 2 diabetes mellitus with hyperglycemia (HCC)   Will see if Magan's insurance with cover the continuous glucose monitor.  Repeat Hgba1C in 3 months.             Magan Holliday was instructed to follow up in the clinic in 3 months for check up or as needed with any medical issues.

## 2024-12-09 NOTE — ASSESSMENT & PLAN NOTE
Will see if Magan's insurance with cover the continuous glucose monitor.  Repeat Hgba1C in 3 months.      Orders:    Continuous Glucose  (DEXCOM G7 ) MONIQUE; Use as directed.    Continuous Glucose Sensor (DEXCOM G7 SENSOR) MISC; Change every 10 days.

## 2024-12-09 NOTE — PROGRESS NOTES
Magan Holliday (:  1985) is a 39 y.o. male,Established patient, here for evaluation of the following chief complaint(s):  Mental Health Problem (Depression, 1 month f/u. Changed zoloft to Effexor  mg) and Diabetes (Continues on Ozempic and metformin, added on Basaglar 20 u daily. Has not checked BS, Discuss continuous glucometer. )         Assessment & Plan  Current moderate episode of major depressive disorder without prior episode (HCC)   Improving but not well controlled.  Add Effexor XR 37.5 mg by mouth daily to the 150 mg dose.    Orders:    venlafaxine (EFFEXOR XR) 37.5 MG extended release capsule; Take 1 capsule by mouth daily Take with the 150 mg dose.    Uncontrolled type 2 diabetes mellitus with hyperglycemia (HCC)   Will see if Magan's insurance with cover the continuous glucose monitor.  Repeat Hgba1C in 3 months.      Orders:    Continuous Glucose  (DEXCOM G7 ) MONIQUE; Use as directed.    Continuous Glucose Sensor (DEXCOM G7 SENSOR) MISC; Change every 10 days.      Magan Holliday was instructed to follow up in the clinic in 3 months for check up or as needed with any medical issues.               Subjective   Magan states he is doing better with his depression and has more get up and go.  He feels it could be a little better.  He has tolerated Effexor XR well.  Magan has not noticed any anxiety.    Basaglar was added last month but he states he has not checked his BS.  He states he does not like poking his finger.  He states he would like to have a continuous glucometer.          Review of Systems   Constitutional: Negative.    HENT:  Negative for congestion, ear pain, postnasal drip, sneezing and sore throat.    Eyes:  Negative for visual disturbance.   Respiratory: Negative.     Cardiovascular:  Negative for chest pain, palpitations and leg swelling.   Gastrointestinal:  Negative for abdominal pain, blood in stool, constipation, diarrhea and nausea.   Genitourinary:

## 2025-01-15 ENCOUNTER — PATIENT MESSAGE (OUTPATIENT)
Dept: FAMILY MEDICINE CLINIC | Age: 40
End: 2025-01-15

## 2025-01-15 DIAGNOSIS — E66.01 CLASS 3 SEVERE OBESITY DUE TO EXCESS CALORIES WITH SERIOUS COMORBIDITY AND BODY MASS INDEX (BMI) OF 45.0 TO 49.9 IN ADULT: Primary | ICD-10-CM

## 2025-01-15 DIAGNOSIS — E66.813 CLASS 3 SEVERE OBESITY DUE TO EXCESS CALORIES WITH SERIOUS COMORBIDITY AND BODY MASS INDEX (BMI) OF 45.0 TO 49.9 IN ADULT: Primary | ICD-10-CM

## 2025-01-15 DIAGNOSIS — E11.65 UNCONTROLLED TYPE 2 DIABETES MELLITUS WITH HYPERGLYCEMIA (HCC): ICD-10-CM

## 2025-02-07 ENCOUNTER — OFFICE VISIT (OUTPATIENT)
Dept: FAMILY MEDICINE CLINIC | Age: 40
End: 2025-02-07

## 2025-02-07 VITALS — BODY MASS INDEX: 44.1 KG/M2 | TEMPERATURE: 98.4 F | HEIGHT: 71 IN | WEIGHT: 315 LBS

## 2025-02-07 DIAGNOSIS — R52 BODY ACHES: ICD-10-CM

## 2025-02-07 DIAGNOSIS — J10.1 INFLUENZA A: Primary | ICD-10-CM

## 2025-02-07 DIAGNOSIS — H66.001 NON-RECURRENT ACUTE SUPPURATIVE OTITIS MEDIA OF RIGHT EAR WITHOUT SPONTANEOUS RUPTURE OF TYMPANIC MEMBRANE: ICD-10-CM

## 2025-02-07 DIAGNOSIS — R68.83 CHILLS: ICD-10-CM

## 2025-02-07 DIAGNOSIS — R09.81 HEAD CONGESTION: ICD-10-CM

## 2025-02-07 DIAGNOSIS — E11.65 UNCONTROLLED TYPE 2 DIABETES MELLITUS WITH HYPERGLYCEMIA (HCC): ICD-10-CM

## 2025-02-07 LAB
INFLUENZA A ANTIBODY: ABNORMAL
INFLUENZA B ANTIBODY: ABNORMAL

## 2025-02-07 RX ORDER — OSELTAMIVIR PHOSPHATE 75 MG/1
75 CAPSULE ORAL 2 TIMES DAILY
Qty: 10 CAPSULE | Refills: 0 | Status: SHIPPED | OUTPATIENT
Start: 2025-02-07 | End: 2025-02-12

## 2025-02-07 RX ORDER — ACYCLOVIR 400 MG/1
TABLET ORAL
Qty: 3 EACH | Refills: 5 | Status: SHIPPED | OUTPATIENT
Start: 2025-02-07

## 2025-02-07 RX ORDER — RISANKIZUMAB-RZAA 150 MG/ML
INJECTION SUBCUTANEOUS
COMMUNITY
Start: 2025-02-04

## 2025-02-07 RX ORDER — AMOXICILLIN 875 MG/1
875 TABLET, COATED ORAL 2 TIMES DAILY
Qty: 20 TABLET | Refills: 0 | Status: SHIPPED | OUTPATIENT
Start: 2025-02-07 | End: 2025-02-17

## 2025-02-07 RX ORDER — BETAMETHASONE DIPROPIONATE 0.5 MG/G
CREAM TOPICAL
COMMUNITY
Start: 2025-01-06

## 2025-02-07 RX ORDER — ACYCLOVIR 400 MG/1
TABLET ORAL
Qty: 1 EACH | Refills: 0 | Status: SHIPPED | OUTPATIENT
Start: 2025-02-07

## 2025-02-07 SDOH — ECONOMIC STABILITY: FOOD INSECURITY: WITHIN THE PAST 12 MONTHS, THE FOOD YOU BOUGHT JUST DIDN'T LAST AND YOU DIDN'T HAVE MONEY TO GET MORE.: NEVER TRUE

## 2025-02-07 SDOH — ECONOMIC STABILITY: FOOD INSECURITY: WITHIN THE PAST 12 MONTHS, YOU WORRIED THAT YOUR FOOD WOULD RUN OUT BEFORE YOU GOT MONEY TO BUY MORE.: NEVER TRUE

## 2025-02-07 ASSESSMENT — PATIENT HEALTH QUESTIONNAIRE - PHQ9
4. FEELING TIRED OR HAVING LITTLE ENERGY: SEVERAL DAYS
10. IF YOU CHECKED OFF ANY PROBLEMS, HOW DIFFICULT HAVE THESE PROBLEMS MADE IT FOR YOU TO DO YOUR WORK, TAKE CARE OF THINGS AT HOME, OR GET ALONG WITH OTHER PEOPLE: SOMEWHAT DIFFICULT
2. FEELING DOWN, DEPRESSED OR HOPELESS: NOT AT ALL
SUM OF ALL RESPONSES TO PHQ QUESTIONS 1-9: 3
7. TROUBLE CONCENTRATING ON THINGS, SUCH AS READING THE NEWSPAPER OR WATCHING TELEVISION: NOT AT ALL
SUM OF ALL RESPONSES TO PHQ9 QUESTIONS 1 & 2: 1
3. TROUBLE FALLING OR STAYING ASLEEP: SEVERAL DAYS
5. POOR APPETITE OR OVEREATING: NOT AT ALL
9. THOUGHTS THAT YOU WOULD BE BETTER OFF DEAD, OR OF HURTING YOURSELF: NOT AT ALL
SUM OF ALL RESPONSES TO PHQ QUESTIONS 1-9: 3
8. MOVING OR SPEAKING SO SLOWLY THAT OTHER PEOPLE COULD HAVE NOTICED. OR THE OPPOSITE, BEING SO FIGETY OR RESTLESS THAT YOU HAVE BEEN MOVING AROUND A LOT MORE THAN USUAL: NOT AT ALL
SUM OF ALL RESPONSES TO PHQ QUESTIONS 1-9: 3
SUM OF ALL RESPONSES TO PHQ QUESTIONS 1-9: 3
6. FEELING BAD ABOUT YOURSELF - OR THAT YOU ARE A FAILURE OR HAVE LET YOURSELF OR YOUR FAMILY DOWN: NOT AT ALL
1. LITTLE INTEREST OR PLEASURE IN DOING THINGS: SEVERAL DAYS

## 2025-02-07 ASSESSMENT — ENCOUNTER SYMPTOMS
NAUSEA: 0
COUGH: 1
DIARRHEA: 0
CHEST TIGHTNESS: 1

## 2025-02-07 NOTE — PROGRESS NOTES
Magan Holliday (:  1985) is a 39 y.o. male,Established patient, here for evaluation of the following chief complaint(s):  Congestion (Chills, body aches started 2 days ago. Daughter tested positive for influenza A yesterday. )          Diagnosis Orders   1. Influenza A  oseltamivir (TAMIFLU) 75 MG capsule      2. Head congestion  POCT Influenza A/B      3. Body aches  POCT Influenza A/B      4. Chills  POCT Influenza A/B      5. Uncontrolled type 2 diabetes mellitus with hyperglycemia (HCC)  Continuous Glucose Sensor (DEXCOM G7 SENSOR) MISC    Continuous Glucose  (DEXCOM G7 ) MONIQUE      6. Non-recurrent acute suppurative otitis media of right ear without spontaneous rupture of tympanic membrane  amoxicillin (AMOXIL) 875 MG tablet         Assessment & Plan  Influenza A    POCT Influenza A-positive  Start Tamiflu for 5 days  Increase fluids  Use Tylenol/Ibuprofen for pain or fever  Over the counter cough/cold medications may be used  1 tsp of honey in warm water will soothe throat and help with cough  Nasal saline nasal spray can be used to keep nasal passages moist and clear   Orders:    oseltamivir (TAMIFLU) 75 MG capsule; Take 1 capsule by mouth 2 times daily for 5 days    Head congestion    POCT Influenza A-positive    Orders:    POCT Influenza A/B    Body aches  POCT Influenza A-positive    Orders:    POCT Influenza A/B    Chills  POCT Influenza A-positive    Orders:    POCT Influenza A/B    Uncontrolled type 2 diabetes mellitus with hyperglycemia (HCC)  Re-order CGM for new insurance coverage    Orders:    Continuous Glucose Sensor (DEXCOM G7 SENSOR) MISC; Change every 10 days.    Continuous Glucose  (DEXCOM G7 ) MONIQUE; Use as directed.    Non-recurrent acute suppurative otitis media of right ear without spontaneous rupture of tympanic membrane    Use Amoxicillin twice daily for 10 days  Can use Tyelnol or Ibuprofen as needed for pain   Orders:    amoxicillin (AMOXIL) 875

## 2025-02-07 NOTE — ASSESSMENT & PLAN NOTE
Re-order CGM for new insurance coverage    Orders:    Continuous Glucose Sensor (DEXCOM G7 SENSOR) MISC; Change every 10 days.    Continuous Glucose  (DEXCOM G7 ) MONIQUE; Use as directed.

## 2025-02-10 ENCOUNTER — TELEPHONE (OUTPATIENT)
Dept: FAMILY MEDICINE CLINIC | Age: 40
End: 2025-02-10

## 2025-02-10 DIAGNOSIS — E66.01 MORBID OBESITY: ICD-10-CM

## 2025-02-10 DIAGNOSIS — R79.89 ELEVATED LFTS: Primary | ICD-10-CM

## 2025-02-10 DIAGNOSIS — K76.0 FATTY LIVER: ICD-10-CM

## 2025-02-10 NOTE — TELEPHONE ENCOUNTER
Follow up on referral to Gastroenterologist. After working the WQ pt did not show up for his appointment with Dr. Hendrix. And it does not appear he has had an appt with Gastro for the WARD.

## 2025-02-17 ENCOUNTER — TELEPHONE (OUTPATIENT)
Dept: FAMILY MEDICINE CLINIC | Age: 40
End: 2025-02-17

## 2025-02-17 DIAGNOSIS — E11.65 UNCONTROLLED TYPE 2 DIABETES MELLITUS WITH HYPERGLYCEMIA (HCC): Primary | ICD-10-CM

## 2025-02-17 NOTE — TELEPHONE ENCOUNTER
Patient started on Mounjaro 2.5 mg weekly 1/15/25. Advised to call in 1 month to have dose increased. Patient has not recorded BS's, Dexcom too expensive, has ordered a mail order continuous glucose monitor to arrive w/in next week.

## 2025-03-06 ENCOUNTER — HOSPITAL ENCOUNTER (OUTPATIENT)
Age: 40
Discharge: HOME | End: 2025-03-06
Payer: COMMERCIAL

## 2025-03-06 DIAGNOSIS — E11.65 UNCONTROLLED TYPE 2 DIABETES MELLITUS WITH HYPERGLYCEMIA (HCC): ICD-10-CM

## 2025-03-06 LAB
ALBUMIN SERPL-MCNC: 4.1 G/DL (ref 3.5–5.2)
ALBUMIN/GLOB SERPL: 1.5 {RATIO} (ref 1–2.5)
ALP SERPL-CCNC: 61 U/L (ref 40–129)
ALT SERPL-CCNC: 85 U/L (ref 5–41)
ANION GAP SERPL CALCULATED.3IONS-SCNC: 9 MMOL/L (ref 9–17)
AST SERPL-CCNC: 76 U/L
BILIRUB SERPL-MCNC: 0.3 MG/DL (ref 0.3–1.2)
BUN SERPL-MCNC: 9 MG/DL (ref 6–20)
CALCIUM SERPL-MCNC: 9.3 MG/DL (ref 8.6–10.4)
CHLORIDE SERPL-SCNC: 103 MMOL/L (ref 98–107)
CO2 SERPL-SCNC: 27 MMOL/L (ref 20–31)
CREAT SERPL-MCNC: 0.7 MG/DL (ref 0.7–1.2)
EST. AVERAGE GLUCOSE BLD GHB EST-MCNC: 214 MG/DL
GFR, ESTIMATED: >90 ML/MIN/1.73M2
GLUCOSE SERPL-MCNC: 155 MG/DL (ref 70–99)
HBA1C MFR BLD: 9.1 % (ref 4–6)
POTASSIUM SERPL-SCNC: 3.4 MMOL/L (ref 3.7–5.3)
PROT SERPL-MCNC: 6.9 G/DL (ref 6.4–8.3)
SODIUM SERPL-SCNC: 139 MMOL/L (ref 135–144)

## 2025-03-06 PROCEDURE — 83036 HEMOGLOBIN GLYCOSYLATED A1C: CPT

## 2025-03-06 PROCEDURE — 80053 COMPREHEN METABOLIC PANEL: CPT

## 2025-03-06 PROCEDURE — 36415 COLL VENOUS BLD VENIPUNCTURE: CPT

## 2025-03-10 ENCOUNTER — OFFICE VISIT (OUTPATIENT)
Dept: FAMILY MEDICINE CLINIC | Age: 40
End: 2025-03-10
Payer: COMMERCIAL

## 2025-03-10 VITALS
HEIGHT: 71 IN | DIASTOLIC BLOOD PRESSURE: 82 MMHG | HEART RATE: 80 BPM | SYSTOLIC BLOOD PRESSURE: 130 MMHG | WEIGHT: 315 LBS | OXYGEN SATURATION: 97 % | BODY MASS INDEX: 44.1 KG/M2

## 2025-03-10 DIAGNOSIS — G47.33 OSA ON CPAP: ICD-10-CM

## 2025-03-10 DIAGNOSIS — E66.01 CLASS 3 SEVERE OBESITY DUE TO EXCESS CALORIES WITH SERIOUS COMORBIDITY AND BODY MASS INDEX (BMI) OF 45.0 TO 49.9 IN ADULT: ICD-10-CM

## 2025-03-10 DIAGNOSIS — E78.2 MIXED HYPERCHOLESTEROLEMIA AND HYPERTRIGLYCERIDEMIA: ICD-10-CM

## 2025-03-10 DIAGNOSIS — K75.81 NASH (NONALCOHOLIC STEATOHEPATITIS): ICD-10-CM

## 2025-03-10 DIAGNOSIS — L40.9 PSORIASIS: ICD-10-CM

## 2025-03-10 DIAGNOSIS — E66.813 CLASS 3 SEVERE OBESITY DUE TO EXCESS CALORIES WITH SERIOUS COMORBIDITY AND BODY MASS INDEX (BMI) OF 45.0 TO 49.9 IN ADULT: ICD-10-CM

## 2025-03-10 DIAGNOSIS — E11.65 UNCONTROLLED TYPE 2 DIABETES MELLITUS WITH HYPERGLYCEMIA (HCC): Primary | ICD-10-CM

## 2025-03-10 DIAGNOSIS — E11.65 UNCONTROLLED TYPE 2 DIABETES MELLITUS WITH HYPERGLYCEMIA (HCC): ICD-10-CM

## 2025-03-10 PROCEDURE — 99214 OFFICE O/P EST MOD 30 MIN: CPT | Performed by: INTERNAL MEDICINE

## 2025-03-10 PROCEDURE — 3046F HEMOGLOBIN A1C LEVEL >9.0%: CPT | Performed by: INTERNAL MEDICINE

## 2025-03-10 RX ORDER — INSULIN GLARGINE 100 [IU]/ML
26 INJECTION, SOLUTION SUBCUTANEOUS DAILY
Qty: 5 ADJUSTABLE DOSE PRE-FILLED PEN SYRINGE | Refills: 3 | Status: SHIPPED | OUTPATIENT
Start: 2025-03-10

## 2025-03-10 RX ORDER — INSULIN LISPRO 100 [IU]/ML
5 INJECTION, SOLUTION INTRAVENOUS; SUBCUTANEOUS
Qty: 3 ADJUSTABLE DOSE PRE-FILLED PEN SYRINGE | Refills: 5 | Status: SHIPPED | OUTPATIENT
Start: 2025-03-10 | End: 2025-03-11

## 2025-03-10 ASSESSMENT — ENCOUNTER SYMPTOMS
SHORTNESS OF BREATH: 0
RESPIRATORY NEGATIVE: 1
CONSTIPATION: 0
SORE THROAT: 0
BLOOD IN STOOL: 0
NAUSEA: 0
ABDOMINAL PAIN: 0
DIARRHEA: 0

## 2025-03-10 NOTE — PROGRESS NOTES
Magan Holliday (:  1985) is a 39 y.o. male,Established patient, here for evaluation of the following chief complaint(s):  Diabetes, Hypertension, Hyperlipidemia, Mental Health Problem (24 changed Zoloft to Effexor 150 mg. 24 added Effexor 37.5 mg to the 150 mg daily. ), Sleep Apnea (cpap), Psoriasis, and Hepatic Disease (WARD. Has not heard from  [place]. Phone number to patient to check on referral. )         Assessment & Plan  Uncontrolled type 2 diabetes mellitus with hyperglycemia (HCC)   Increase Basaglar to 26 units daily.  Add Humalog 5 units with each meal.  Increase Tirzepatide to 7.5 mg weekly x 1 month then call in to increase to 10 mg.  Obtain labs approximately one week prior to the office appointment.  Please fast for 12 hours prior to obtaining the labs.  Water or black coffee (no cream or sugar) is allowed prior to the the labs.      Orders:    insulin glargine (BASAGLAR KWIKPEN) 100 UNIT/ML injection pen; Inject 26 Units into the skin daily    insulin lispro, 1 Unit Dial, (HUMALOG KWIKPEN) 100 UNIT/ML SOPN; Inject 5 Units into the skin 3 times daily (before meals)    Tirzepatide 7.5 MG/0.5ML SOAJ; Inject 7.5 mg into the skin every 7 days    Comprehensive Metabolic Panel; Future    Hemoglobin A1C; Future    Albumin/Creatinine Ratio, Urine; Future    ROSA on CPAP   Controlled on CPAP.         WARD (nonalcoholic steatohepatitis)   Continue to work on wt loss and low carb diet.  Obtain labs approximately one week prior to the office appointment.  Please fast for 12 hours prior to obtaining the labs.  Water or black coffee (no cream or sugar) is allowed prior to the the labs.           Class 3 severe obesity due to excess calories with serious comorbidity and body mass index (BMI) of 45.0 to 49.9 in adult   Continue on low carb diet.  Work on exercise daily.         Psoriasis   Follows with Dermatology.           Mixed hypercholesterolemia and hypertriglyceridemia   Continue on

## 2025-03-10 NOTE — ASSESSMENT & PLAN NOTE
Increase Basaglar to 26 units daily.  Add Humalog 5 units with each meal.  Increase Tirzepatide to 7.5 mg weekly x 1 month then call in to increase to 10 mg.  Obtain labs approximately one week prior to the office appointment.  Please fast for 12 hours prior to obtaining the labs.  Water or black coffee (no cream or sugar) is allowed prior to the the labs.      Orders:    insulin glargine (BASAGLAR KWIKPEN) 100 UNIT/ML injection pen; Inject 26 Units into the skin daily    insulin lispro, 1 Unit Dial, (HUMALOG KWIKPEN) 100 UNIT/ML SOPN; Inject 5 Units into the skin 3 times daily (before meals)    Tirzepatide 7.5 MG/0.5ML SOAJ; Inject 7.5 mg into the skin every 7 days    Comprehensive Metabolic Panel; Future    Hemoglobin A1C; Future    Albumin/Creatinine Ratio, Urine; Future

## 2025-03-10 NOTE — PATIENT INSTRUCTIONS
Survey:     You may be receiving a survey from [employer] regarding your visit today.     You may get this in the mail, through your MyChart or in your email.      Please complete the survey to enable us to provide the highest quality of care to you and your family. Please also, mention our names.     If you cannot score us as very good (5 Stars) on any question, please feel free to call the office to discuss how we could have made your experience exceptional.      Thank You!        Dr. Naranjo, MD Burns, JOSE Hooker, DAYSI Hollis, APRN MARILYN Wong, DAYSI Gonzalez, CMA       Assessment & Plan  Uncontrolled type 2 diabetes mellitus with hyperglycemia (HCC)   Increase Basaglar to 26 units daily.  Add Humalog 5 units with each meal.  Increase Tirzepatide to 7.5 mg weekly x 1 month then call in to increase to 10 mg.  Obtain labs approximately one week prior to the office appointment.  Please fast for 12 hours prior to obtaining the labs.  Water or black coffee (no cream or sugar) is allowed prior to the the labs.      Orders:    insulin glargine (BASAGLAR KWIKPEN) 100 UNIT/ML injection pen; Inject 26 Units into the skin daily    insulin lispro, 1 Unit Dial, (HUMALOG KWIKPEN) 100 UNIT/ML SOPN; Inject 5 Units into the skin 3 times daily (before meals)    Tirzepatide 7.5 MG/0.5ML SOAJ; Inject 7.5 mg into the skin every 7 days    ROSA on CPAP   Controlled on CPAP.         WARD (nonalcoholic steatohepatitis)   Continue to work on wt loss and low carb diet.  Obtain labs approximately one week prior to the office appointment.  Please fast for 12 hours prior to obtaining the labs.  Water or black coffee (no cream or sugar) is allowed prior to the the labs.           Class 3 severe obesity due to excess calories with serious comorbidity and body mass index (BMI) of 45.0 to 49.9 in adult   Continue on low carb diet.  Work on exercise daily.         Psoriasis   Follows with Dermatology.           Mixed hypercholesterolemia

## 2025-03-10 NOTE — ASSESSMENT & PLAN NOTE
Continue to work on wt loss and low carb diet.  Obtain labs approximately one week prior to the office appointment.  Please fast for 12 hours prior to obtaining the labs.  Water or black coffee (no cream or sugar) is allowed prior to the the labs.

## 2025-03-11 RX ORDER — INSULIN LISPRO 100 [IU]/ML
INJECTION, SOLUTION INTRAVENOUS; SUBCUTANEOUS
Qty: 15 ML | Refills: 5 | Status: SHIPPED | OUTPATIENT
Start: 2025-03-11

## 2025-04-07 DIAGNOSIS — E11.65 UNCONTROLLED TYPE 2 DIABETES MELLITUS WITH HYPERGLYCEMIA (HCC): ICD-10-CM

## 2025-04-07 RX ORDER — METFORMIN HYDROCHLORIDE 500 MG/1
1000 TABLET, EXTENDED RELEASE ORAL DAILY
Qty: 60 TABLET | Refills: 5 | Status: SHIPPED | OUTPATIENT
Start: 2025-04-07

## 2025-04-07 NOTE — TELEPHONE ENCOUNTER
Last OV: 3/10/2025  Last RX:    Next scheduled apt: 6/17/2025     Cimzia Counseling:  I discussed with the patient the risks of Cimzia including but not limited to immunosuppression, allergic reactions and infections.  The patient understands that monitoring is required including a PPD at baseline and must alert us or the primary physician if symptoms of infection or other concerning signs are noted.

## 2025-04-09 DIAGNOSIS — E11.65 UNCONTROLLED TYPE 2 DIABETES MELLITUS WITH HYPERGLYCEMIA (HCC): ICD-10-CM

## 2025-04-21 DIAGNOSIS — E11.65 UNCONTROLLED TYPE 2 DIABETES MELLITUS WITH HYPERGLYCEMIA (HCC): ICD-10-CM

## 2025-04-21 RX ORDER — ACYCLOVIR 400 MG/1
TABLET ORAL
Qty: 3 EACH | Refills: 5 | Status: SHIPPED | OUTPATIENT
Start: 2025-04-21

## 2025-04-21 RX ORDER — ACYCLOVIR 400 MG/1
TABLET ORAL
Qty: 1 EACH | Refills: 0 | Status: SHIPPED | OUTPATIENT
Start: 2025-04-21

## 2025-04-24 RX ORDER — ACYCLOVIR 800 MG/1
TABLET ORAL
Qty: 2 EACH | Refills: 11 | Status: SHIPPED | OUTPATIENT
Start: 2025-04-24

## 2025-05-09 DIAGNOSIS — E78.2 MIXED HYPERCHOLESTEROLEMIA AND HYPERTRIGLYCERIDEMIA: ICD-10-CM

## 2025-05-09 DIAGNOSIS — I10 PRIMARY HYPERTENSION: ICD-10-CM

## 2025-05-09 RX ORDER — PRAVASTATIN SODIUM 20 MG
20 TABLET ORAL EVERY EVENING
Qty: 30 TABLET | Refills: 5 | OUTPATIENT
Start: 2025-05-09

## 2025-05-09 RX ORDER — LISINOPRIL 5 MG/1
5 TABLET ORAL DAILY
Qty: 90 TABLET | Refills: 1 | Status: SHIPPED | OUTPATIENT
Start: 2025-05-09

## 2025-05-09 RX ORDER — PRAVASTATIN SODIUM 40 MG
40 TABLET ORAL DAILY
Qty: 30 TABLET | Refills: 1 | Status: SHIPPED | OUTPATIENT
Start: 2025-05-09

## 2025-05-09 NOTE — TELEPHONE ENCOUNTER
Last OV 3/10/25     Next OV 6/17/25    Requesting refill on lisinopril and pravastatin thru [facility]  Rx's pending

## 2025-06-02 DIAGNOSIS — F32.1 CURRENT MODERATE EPISODE OF MAJOR DEPRESSIVE DISORDER WITHOUT PRIOR EPISODE (HCC): ICD-10-CM

## 2025-06-02 RX ORDER — VENLAFAXINE HYDROCHLORIDE 150 MG/1
150 CAPSULE, EXTENDED RELEASE ORAL DAILY
Qty: 30 CAPSULE | Refills: 5 | Status: SHIPPED | OUTPATIENT
Start: 2025-06-02

## 2025-06-04 ENCOUNTER — OFFICE VISIT (OUTPATIENT)
Dept: FAMILY MEDICINE CLINIC | Age: 40
End: 2025-06-04
Payer: COMMERCIAL

## 2025-06-04 VITALS — HEIGHT: 71 IN | BODY MASS INDEX: 43.82 KG/M2 | WEIGHT: 313 LBS

## 2025-06-04 DIAGNOSIS — F32.1 CURRENT MODERATE EPISODE OF MAJOR DEPRESSIVE DISORDER WITHOUT PRIOR EPISODE (HCC): Primary | ICD-10-CM

## 2025-06-04 PROCEDURE — 99214 OFFICE O/P EST MOD 30 MIN: CPT | Performed by: LICENSED PRACTICAL NURSE

## 2025-06-04 RX ORDER — PEN NEEDLE, DIABETIC 29 G X1/2"
NEEDLE, DISPOSABLE MISCELLANEOUS
COMMUNITY
Start: 2025-04-07

## 2025-06-04 RX ORDER — BUSPIRONE HYDROCHLORIDE 10 MG/1
10 TABLET ORAL 2 TIMES DAILY
Qty: 60 TABLET | Refills: 2 | Status: SHIPPED | OUTPATIENT
Start: 2025-06-04

## 2025-06-04 NOTE — PATIENT INSTRUCTIONS
[facility] Crisis Hotline-(8 am -Midnight)-[phone]    After Midnight dial-988    National Suicide Hotline-988 (call or text)    [facility] Xeinwa-[phone]            Crxavmt-[phone]            Wjmkdtmzc-[phone]    [facility] Olomijyspb-[phone]    [facility] Wkurzthq-[phone]    [facility]-[phone]    [facility] ([place])-[phone]  [facility] ([place])-[phone]    [facility] [phone]    Text \"[employer]\" #203841 (24/7 support)    's Crisis Weko-[phone] or text #366379    FIRST CALL [phone])    [address]     
196.4

## 2025-06-04 NOTE — PROGRESS NOTES
Magan Holliday (:  1985) is a 39 y.o. male,Established patient, here for evaluation of the following chief complaint(s):  Personal Problem (Marital problems- wife has told him she doesn't feel like they need to be together anymore. They have been together for 18 years and have 8 kids. He has contacted a place for counseling for himself. She has refused to go. )       Diagnosis Orders   1. Current moderate episode of major depressive disorder without prior episode (HCC)  busPIRone (BUSPAR) 10 MG tablet          Assessment & Plan  Current moderate episode of major depressive disorder without prior episode (HCC)    Uncontrolled  Continue Effexor XR, no change in dose  Start Buspar 10 mg BID  Recommend following up with counseling as soon as possible (additional resources provided)  Orders:    busPIRone (BUSPAR) 10 MG tablet; Take 1 tablet by mouth 2 times daily      Return in about 4 weeks (around 2025) for follow up anxiety.       Subjective   Magan Holliday presents today with complaints of increased depression. He states his wife told him last night she did not want to be together. He reports he is devastated and he feels \"broken\". He is currently taking Effexor XR (37.5 mg + 150 mg) and he feels with recent news he needs to take something additional. He did reach out to counseling today and left a message to establish. He denies suicidal thoughts today. He states there was a time in the past he did think about suicide.         Review of Systems   Psychiatric/Behavioral:  Negative for self-injury and suicidal ideas.         Crying, increased depression          Objective   Physical Exam  Vitals and nursing note reviewed.   Neurological:      Mental Status: He is alert.   Psychiatric:         Attention and Perception: Attention normal.         Mood and Affect: Mood is depressed. Affect is tearful.         Speech: Speech normal.         Behavior: Behavior normal. Behavior is cooperative.         Thought

## 2025-07-03 ENCOUNTER — HOSPITAL ENCOUNTER (OUTPATIENT)
Age: 40
Discharge: HOME | End: 2025-07-03
Payer: COMMERCIAL

## 2025-07-03 DIAGNOSIS — E78.2 MIXED HYPERCHOLESTEROLEMIA AND HYPERTRIGLYCERIDEMIA: ICD-10-CM

## 2025-07-03 DIAGNOSIS — E11.65 UNCONTROLLED TYPE 2 DIABETES MELLITUS WITH HYPERGLYCEMIA (HCC): ICD-10-CM

## 2025-07-03 LAB
ALBUMIN SERPL-MCNC: 4.6 G/DL (ref 3.5–5.2)
ALBUMIN/GLOB SERPL: 1.7 {RATIO} (ref 1–2.5)
ALP SERPL-CCNC: 54 U/L (ref 40–129)
ALT SERPL-CCNC: 46 U/L (ref 5–41)
ANION GAP SERPL CALCULATED.3IONS-SCNC: 11 MMOL/L (ref 9–17)
AST SERPL-CCNC: 30 U/L
BILIRUB SERPL-MCNC: 0.7 MG/DL (ref 0.3–1.2)
BUN SERPL-MCNC: 8 MG/DL (ref 6–20)
CALCIUM SERPL-MCNC: 9.4 MG/DL (ref 8.6–10.4)
CHLORIDE SERPL-SCNC: 105 MMOL/L (ref 98–107)
CHOLEST SERPL-MCNC: 112 MG/DL (ref 0–199)
CHOLESTEROL/HDL RATIO: 2.7
CO2 SERPL-SCNC: 26 MMOL/L (ref 20–31)
CREAT SERPL-MCNC: 0.7 MG/DL (ref 0.7–1.2)
CREAT UR-MCNC: 570 MG/DL (ref 39–259)
EST. AVERAGE GLUCOSE BLD GHB EST-MCNC: 123 MG/DL
GFR, ESTIMATED: >90 ML/MIN/1.73M2
GLUCOSE SERPL-MCNC: 93 MG/DL (ref 70–99)
HBA1C MFR BLD: 5.9 % (ref 4–6)
HDLC SERPL-MCNC: 42 MG/DL
LDLC SERPL CALC-MCNC: 56 MG/DL (ref 0–100)
MICROALBUMIN UR-MCNC: 46 MG/L (ref 0–20)
MICROALBUMIN/CREAT UR-RTO: 8 MCG/MG CREAT (ref 0–17)
POTASSIUM SERPL-SCNC: 3.1 MMOL/L (ref 3.7–5.3)
PROT SERPL-MCNC: 7.3 G/DL (ref 6.4–8.3)
SODIUM SERPL-SCNC: 142 MMOL/L (ref 135–144)
TRIGL SERPL-MCNC: 68 MG/DL
VLDLC SERPL CALC-MCNC: 14 MG/DL (ref 1–30)

## 2025-07-03 PROCEDURE — 83036 HEMOGLOBIN GLYCOSYLATED A1C: CPT

## 2025-07-03 PROCEDURE — 82043 UR ALBUMIN QUANTITATIVE: CPT

## 2025-07-03 PROCEDURE — 82570 ASSAY OF URINE CREATININE: CPT

## 2025-07-03 PROCEDURE — 80061 LIPID PANEL: CPT

## 2025-07-03 PROCEDURE — 36415 COLL VENOUS BLD VENIPUNCTURE: CPT

## 2025-07-03 PROCEDURE — 80053 COMPREHEN METABOLIC PANEL: CPT

## 2025-07-07 DIAGNOSIS — E87.6 HYPOKALEMIA: Primary | ICD-10-CM

## 2025-07-07 RX ORDER — POTASSIUM CHLORIDE 1500 MG/1
20 TABLET, EXTENDED RELEASE ORAL DAILY
Qty: 90 TABLET | Refills: 1 | Status: SHIPPED | OUTPATIENT
Start: 2025-07-07

## 2025-07-14 ENCOUNTER — OFFICE VISIT (OUTPATIENT)
Dept: FAMILY MEDICINE CLINIC | Age: 40
End: 2025-07-14
Payer: COMMERCIAL

## 2025-07-14 VITALS
HEART RATE: 75 BPM | SYSTOLIC BLOOD PRESSURE: 124 MMHG | OXYGEN SATURATION: 99 % | HEIGHT: 72 IN | BODY MASS INDEX: 38.33 KG/M2 | WEIGHT: 283 LBS | DIASTOLIC BLOOD PRESSURE: 84 MMHG

## 2025-07-14 DIAGNOSIS — Z00.00 WELLNESS EXAMINATION: Primary | ICD-10-CM

## 2025-07-14 DIAGNOSIS — E11.9 CONTROLLED TYPE 2 DIABETES MELLITUS WITHOUT COMPLICATION, WITHOUT LONG-TERM CURRENT USE OF INSULIN (HCC): ICD-10-CM

## 2025-07-14 DIAGNOSIS — G47.33 OSA ON CPAP: ICD-10-CM

## 2025-07-14 DIAGNOSIS — E78.2 MIXED HYPERCHOLESTEROLEMIA AND HYPERTRIGLYCERIDEMIA: ICD-10-CM

## 2025-07-14 DIAGNOSIS — I10 PRIMARY HYPERTENSION: ICD-10-CM

## 2025-07-14 DIAGNOSIS — K75.81 NASH (NONALCOHOLIC STEATOHEPATITIS): ICD-10-CM

## 2025-07-14 DIAGNOSIS — L40.0 PSORIASIS VULGARIS: ICD-10-CM

## 2025-07-14 PROCEDURE — 99395 PREV VISIT EST AGE 18-39: CPT | Performed by: INTERNAL MEDICINE

## 2025-07-14 PROCEDURE — 3079F DIAST BP 80-89 MM HG: CPT | Performed by: INTERNAL MEDICINE

## 2025-07-14 PROCEDURE — 3074F SYST BP LT 130 MM HG: CPT | Performed by: INTERNAL MEDICINE

## 2025-07-14 RX ORDER — HYDRALAZINE HYDROCHLORIDE 10 MG/1
10 TABLET, FILM COATED ORAL 3 TIMES DAILY
COMMUNITY

## 2025-07-14 ASSESSMENT — ENCOUNTER SYMPTOMS
CONSTIPATION: 0
RESPIRATORY NEGATIVE: 1
BLOOD IN STOOL: 0
ABDOMINAL PAIN: 0
DIARRHEA: 0
NAUSEA: 0
SORE THROAT: 0

## 2025-07-14 NOTE — PROGRESS NOTES
Magan Holliday (:  1985) is a 40 y.o. male,Established patient, here for evaluation of the following chief complaint(s):  Annual Exam, Mental Health Problem (Increased Buspar + added hydrazine prn- pt doing okay ), Diabetes Mellitus (A1C- 7/3/25-5.9), Sleep Apnea (Cpap/), and Hepatic Disease (WARD- pt has not followed up [facility] yet)         Assessment & Plan  Wellness examination   Continue to work on exercise / wt loss.  Continue the current medication.   Obtain labs approximately one week prior to the office appointment.  Please fast for 12 hours prior to obtaining the labs.  Water or black coffee (no cream or sugar) is allowed prior to the the labs.           Controlled type 2 diabetes mellitus without complication, without long-term current use of insulin (HCC)   Controlled on Metformin and Zepbound.  No change in medication.  Obtain labs approximately one week prior to the office appointment.  Please fast for 12 hours prior to obtaining the labs.  Water or black coffee (no cream or sugar) is allowed prior to the the labs.      Orders:    Comprehensive Metabolic Panel; Future    Hemoglobin A1C; Future    Albumin/Creatinine Ratio, Urine; Future    ROSA on CPAP   Has not been using CPAP with weight loss.          WARD (nonalcoholic steatohepatitis)   LFT's improving with weight loss and GLP - 1.  Continue to work on wt loss.          Primary hypertension   Controlled on Lisinopril and Hydralazine.  No change in medication.     Orders:    Comprehensive Metabolic Panel; Future    Psoriasis vulgaris   Doing well on Skyrizi. No change in medication.          Mixed hypercholesterolemia and hypertriglyceridemia   Controlled on statin.  No change in medication.  Obtain labs approximately one week prior to the office appointment.  Please fast for 12 hours prior to obtaining the labs.  Water or black coffee (no cream or sugar) is allowed prior to the the labs.      Orders:    Comprehensive Metabolic Panel;

## 2025-07-14 NOTE — PATIENT INSTRUCTIONS
Assessment & Plan  Controlled type 2 diabetes mellitus without complication, without long-term current use of insulin (HCC)   Controlled on Metformin and Zepbound.  No change in medication.  Obtain labs approximately one week prior to the office appointment.  Please fast for 12 hours prior to obtaining the labs.  Water or black coffee (no cream or sugar) is allowed prior to the the labs.      Orders:    Comprehensive Metabolic Panel; Future    Hemoglobin A1C; Future    Albumin/Creatinine Ratio, Urine; Future    ROSA on CPAP   Has not been using CPAP with weight loss.          WARD (nonalcoholic steatohepatitis)   LFT's improving with weight loss and GLP - 1.  Continue to work on wt loss.          Primary hypertension   Controlled on Lisinopril and Hydralazine.  No change in medication.     Orders:    Comprehensive Metabolic Panel; Future    Psoriasis vulgaris   Doing well on Skyrizi. No change in medication.          Mixed hypercholesterolemia and hypertriglyceridemia   Controlled on statin.  No change in medication.  Obtain labs approximately one week prior to the office appointment.  Please fast for 12 hours prior to obtaining the labs.  Water or black coffee (no cream or sugar) is allowed prior to the the labs.      Orders:    Comprehensive Metabolic Panel; Future    Lipid Panel; Future      Magan was instructed to follow up in the clinic in 6 months for check up or as needed with any medical issues.

## 2025-07-25 DIAGNOSIS — E78.2 MIXED HYPERCHOLESTEROLEMIA AND HYPERTRIGLYCERIDEMIA: ICD-10-CM

## 2025-07-25 RX ORDER — PRAVASTATIN SODIUM 40 MG
40 TABLET ORAL DAILY
Qty: 30 TABLET | Refills: 2 | Status: SHIPPED | OUTPATIENT
Start: 2025-07-25

## 2025-09-02 RX ORDER — HYDROXYZINE HYDROCHLORIDE 25 MG/1
25 TABLET, FILM COATED ORAL EVERY 8 HOURS PRN
Qty: 30 TABLET | Refills: 3 | Status: SHIPPED | OUTPATIENT
Start: 2025-09-02 | End: 2025-09-12

## 2025-09-04 DIAGNOSIS — F32.1 CURRENT MODERATE EPISODE OF MAJOR DEPRESSIVE DISORDER WITHOUT PRIOR EPISODE (HCC): ICD-10-CM

## 2025-09-04 RX ORDER — VENLAFAXINE HYDROCHLORIDE 37.5 MG/1
37.5 CAPSULE, EXTENDED RELEASE ORAL DAILY
Qty: 30 CAPSULE | Refills: 5 | Status: SHIPPED | OUTPATIENT
Start: 2025-09-04